# Patient Record
Sex: FEMALE | Race: WHITE | NOT HISPANIC OR LATINO | Employment: UNEMPLOYED | URBAN - METROPOLITAN AREA
[De-identification: names, ages, dates, MRNs, and addresses within clinical notes are randomized per-mention and may not be internally consistent; named-entity substitution may affect disease eponyms.]

---

## 2018-02-19 ENCOUNTER — APPOINTMENT (EMERGENCY)
Dept: CT IMAGING | Facility: HOSPITAL | Age: 58
End: 2018-02-19
Payer: COMMERCIAL

## 2018-02-19 ENCOUNTER — HOSPITAL ENCOUNTER (EMERGENCY)
Facility: HOSPITAL | Age: 58
Discharge: ANOTHER HOSPITAL | End: 2018-02-19
Attending: EMERGENCY MEDICINE
Payer: COMMERCIAL

## 2018-02-19 ENCOUNTER — APPOINTMENT (OUTPATIENT)
Dept: INTERVENTIONAL RADIOLOGY/VASCULAR | Facility: HOSPITAL | Age: 58
End: 2018-02-19
Attending: EMERGENCY MEDICINE
Payer: COMMERCIAL

## 2018-02-19 VITALS
HEIGHT: 68 IN | TEMPERATURE: 101.4 F | BODY MASS INDEX: 25.01 KG/M2 | HEART RATE: 130 BPM | WEIGHT: 165 LBS | RESPIRATION RATE: 34 BRPM | DIASTOLIC BLOOD PRESSURE: 61 MMHG | OXYGEN SATURATION: 94 % | SYSTOLIC BLOOD PRESSURE: 101 MMHG

## 2018-02-19 DIAGNOSIS — A41.9 SEPSIS (HCC): Primary | ICD-10-CM

## 2018-02-19 DIAGNOSIS — T81.43XA POSTPROCEDURAL INTRAABDOMINAL ABSCESS: ICD-10-CM

## 2018-02-19 DIAGNOSIS — J90 PLEURAL EFFUSION: ICD-10-CM

## 2018-02-19 LAB
ALBUMIN SERPL BCP-MCNC: 1.3 G/DL (ref 3.5–5)
ALP SERPL-CCNC: 342 U/L (ref 46–116)
ALT SERPL W P-5'-P-CCNC: 126 U/L (ref 12–78)
ANION GAP BLD CALC-SCNC: 20 MMOL/L (ref 4–13)
ANION GAP SERPL CALCULATED.3IONS-SCNC: 11 MMOL/L (ref 4–13)
APPEARANCE FLD: ABNORMAL
APTT PPP: 33 SECONDS (ref 23–35)
AST SERPL W P-5'-P-CCNC: 208 U/L (ref 5–45)
BACTERIA UR QL AUTO: ABNORMAL /HPF
BASOPHILS # BLD MANUAL: 0 THOUSAND/UL (ref 0–0.1)
BASOPHILS NFR MAR MANUAL: 0 % (ref 0–1)
BILIRUB SERPL-MCNC: 0.9 MG/DL (ref 0.2–1)
BILIRUB UR QL STRIP: NEGATIVE
BUN BLD-MCNC: 9 MG/DL (ref 5–25)
BUN SERPL-MCNC: 10 MG/DL (ref 5–25)
CA-I BLD-SCNC: 1.01 MMOL/L (ref 1.12–1.32)
CALCIUM SERPL-MCNC: 7.8 MG/DL (ref 8.3–10.1)
CHLORIDE BLD-SCNC: 96 MMOL/L (ref 100–108)
CHLORIDE SERPL-SCNC: 98 MMOL/L (ref 100–108)
CLARITY UR: CLEAR
CO2 SERPL-SCNC: 26 MMOL/L (ref 21–32)
COLOR FLD: YELLOW
COLOR UR: YELLOW
CREAT BLD-MCNC: 0.6 MG/DL (ref 0.6–1.3)
CREAT SERPL-MCNC: 0.78 MG/DL (ref 0.6–1.3)
EOSINOPHIL # BLD MANUAL: 0 THOUSAND/UL (ref 0–0.4)
EOSINOPHIL NFR BLD MANUAL: 0 % (ref 0–6)
ERYTHROCYTE [DISTWIDTH] IN BLOOD BY AUTOMATED COUNT: 14.7 % (ref 11.6–15.1)
GFR SERPL CREATININE-BSD FRML MDRD: 102 ML/MIN/1.73SQ M
GFR SERPL CREATININE-BSD FRML MDRD: 85 ML/MIN/1.73SQ M
GLUCOSE SERPL-MCNC: 102 MG/DL (ref 65–140)
GLUCOSE SERPL-MCNC: 106 MG/DL (ref 65–140)
GLUCOSE UR STRIP-MCNC: NEGATIVE MG/DL
HCT VFR BLD AUTO: 27.5 % (ref 34.8–46.1)
HCT VFR BLD CALC: 29 % (ref 34.8–46.1)
HGB BLD-MCNC: 9.2 G/DL (ref 11.5–15.4)
HGB BLDA-MCNC: 9.9 G/DL (ref 11.5–15.4)
HGB UR QL STRIP.AUTO: ABNORMAL
INR PPP: 1.11 (ref 0.86–1.16)
KETONES UR STRIP-MCNC: ABNORMAL MG/DL
LACTATE SERPL-SCNC: 1.8 MMOL/L (ref 0.5–2)
LEUKOCYTE ESTERASE UR QL STRIP: NEGATIVE
LYMPHOCYTES # BLD AUTO: 0.57 THOUSAND/UL (ref 0.6–4.47)
LYMPHOCYTES # BLD AUTO: 2 % (ref 14–44)
LYMPHOCYTES NFR BLD AUTO: 5 %
MCH RBC QN AUTO: 29.3 PG (ref 26.8–34.3)
MCHC RBC AUTO-ENTMCNC: 33.5 G/DL (ref 31.4–37.4)
MCV RBC AUTO: 88 FL (ref 82–98)
METAMYELOCYTES NFR BLD MANUAL: 2 % (ref 0–1)
MONO+MESO NFR FLD MANUAL: 9 %
MONOCYTES # BLD AUTO: 0.57 THOUSAND/UL (ref 0–1.22)
MONOCYTES NFR BLD AUTO: 8 %
MONOCYTES NFR BLD: 2 % (ref 4–12)
NEUTROPHILS # BLD MANUAL: 26.7 THOUSAND/UL (ref 1.85–7.62)
NEUTS BAND NFR BLD MANUAL: 1 % (ref 0–8)
NEUTS SEG NFR BLD AUTO: 78 %
NEUTS SEG NFR BLD AUTO: 92 % (ref 43–75)
NITRITE UR QL STRIP: NEGATIVE
NON-SQ EPI CELLS URNS QL MICRO: ABNORMAL /HPF
NRBC BLD AUTO-RTO: 0 /100 WBCS
PCO2 BLD: 23 MMOL/L (ref 21–32)
PH UR STRIP.AUTO: 6 [PH] (ref 4.5–8)
PLATELET # BLD AUTO: 651 THOUSANDS/UL (ref 149–390)
PLATELET BLD QL SMEAR: ABNORMAL
PMV BLD AUTO: 9.3 FL (ref 8.9–12.7)
POTASSIUM BLD-SCNC: 3.6 MMOL/L (ref 3.5–5.3)
POTASSIUM SERPL-SCNC: 3.7 MMOL/L (ref 3.5–5.3)
PROT SERPL-MCNC: 5.7 G/DL (ref 6.4–8.2)
PROT UR STRIP-MCNC: ABNORMAL MG/DL
PROTHROMBIN TIME: 14.6 SECONDS (ref 12.1–14.4)
RBC # BLD AUTO: 3.14 MILLION/UL (ref 3.81–5.12)
RBC #/AREA URNS AUTO: ABNORMAL /HPF
SITE: ABNORMAL
SODIUM BLD-SCNC: 134 MMOL/L (ref 136–145)
SODIUM SERPL-SCNC: 135 MMOL/L (ref 136–145)
SP GR UR STRIP.AUTO: 1.01 (ref 1–1.03)
SPECIMEN SOURCE: ABNORMAL
TOTAL CELLS COUNTED SPEC: 100
TOTAL CELLS COUNTED SPEC: 100
TROPONIN I SERPL-MCNC: 0.22 NG/ML
UROBILINOGEN UR QL STRIP.AUTO: 1 E.U./DL
VARIANT LYMPHS # BLD AUTO: 1 %
WBC # BLD AUTO: 28.71 THOUSAND/UL (ref 4.31–10.16)
WBC # FLD MANUAL: 6724 /UL
WBC #/AREA URNS AUTO: ABNORMAL /HPF

## 2018-02-19 PROCEDURE — 81001 URINALYSIS AUTO W/SCOPE: CPT | Performed by: EMERGENCY MEDICINE

## 2018-02-19 PROCEDURE — 89051 BODY FLUID CELL COUNT: CPT | Performed by: EMERGENCY MEDICINE

## 2018-02-19 PROCEDURE — 85014 HEMATOCRIT: CPT

## 2018-02-19 PROCEDURE — 32555 ASPIRATE PLEURA W/ IMAGING: CPT | Performed by: RADIOLOGY

## 2018-02-19 PROCEDURE — 85610 PROTHROMBIN TIME: CPT | Performed by: EMERGENCY MEDICINE

## 2018-02-19 PROCEDURE — 96361 HYDRATE IV INFUSION ADD-ON: CPT

## 2018-02-19 PROCEDURE — 85027 COMPLETE CBC AUTOMATED: CPT | Performed by: EMERGENCY MEDICINE

## 2018-02-19 PROCEDURE — 99285 EMERGENCY DEPT VISIT HI MDM: CPT

## 2018-02-19 PROCEDURE — 36415 COLL VENOUS BLD VENIPUNCTURE: CPT | Performed by: EMERGENCY MEDICINE

## 2018-02-19 PROCEDURE — 32555 ASPIRATE PLEURA W/ IMAGING: CPT

## 2018-02-19 PROCEDURE — 80053 COMPREHEN METABOLIC PANEL: CPT | Performed by: EMERGENCY MEDICINE

## 2018-02-19 PROCEDURE — 74177 CT ABD & PELVIS W/CONTRAST: CPT

## 2018-02-19 PROCEDURE — 96374 THER/PROPH/DIAG INJ IV PUSH: CPT

## 2018-02-19 PROCEDURE — 96376 TX/PRO/DX INJ SAME DRUG ADON: CPT

## 2018-02-19 PROCEDURE — 71275 CT ANGIOGRAPHY CHEST: CPT

## 2018-02-19 PROCEDURE — 87147 CULTURE TYPE IMMUNOLOGIC: CPT | Performed by: EMERGENCY MEDICINE

## 2018-02-19 PROCEDURE — 80047 BASIC METABLC PNL IONIZED CA: CPT

## 2018-02-19 PROCEDURE — 85007 BL SMEAR W/DIFF WBC COUNT: CPT | Performed by: EMERGENCY MEDICINE

## 2018-02-19 PROCEDURE — 96375 TX/PRO/DX INJ NEW DRUG ADDON: CPT

## 2018-02-19 PROCEDURE — 87205 SMEAR GRAM STAIN: CPT | Performed by: EMERGENCY MEDICINE

## 2018-02-19 PROCEDURE — 93005 ELECTROCARDIOGRAM TRACING: CPT

## 2018-02-19 PROCEDURE — 87185 SC STD ENZYME DETCJ PER NZM: CPT | Performed by: EMERGENCY MEDICINE

## 2018-02-19 PROCEDURE — 84484 ASSAY OF TROPONIN QUANT: CPT | Performed by: EMERGENCY MEDICINE

## 2018-02-19 PROCEDURE — 87076 CULTURE ANAEROBE IDENT EACH: CPT | Performed by: EMERGENCY MEDICINE

## 2018-02-19 PROCEDURE — 96365 THER/PROPH/DIAG IV INF INIT: CPT

## 2018-02-19 PROCEDURE — 87070 CULTURE OTHR SPECIMN AEROBIC: CPT | Performed by: EMERGENCY MEDICINE

## 2018-02-19 PROCEDURE — 83605 ASSAY OF LACTIC ACID: CPT | Performed by: EMERGENCY MEDICINE

## 2018-02-19 PROCEDURE — 85730 THROMBOPLASTIN TIME PARTIAL: CPT | Performed by: EMERGENCY MEDICINE

## 2018-02-19 PROCEDURE — 87040 BLOOD CULTURE FOR BACTERIA: CPT | Performed by: EMERGENCY MEDICINE

## 2018-02-19 RX ORDER — LIDOCAINE HYDROCHLORIDE 10 MG/ML
INJECTION, SOLUTION INFILTRATION; PERINEURAL CODE/TRAUMA/SEDATION MEDICATION
Status: COMPLETED | OUTPATIENT
Start: 2018-02-19 | End: 2018-02-19

## 2018-02-19 RX ORDER — ACETAMINOPHEN 650 MG/1
650 SUPPOSITORY RECTAL ONCE
Status: COMPLETED | OUTPATIENT
Start: 2018-02-19 | End: 2018-02-19

## 2018-02-19 RX ORDER — FENTANYL CITRATE 50 UG/ML
50 INJECTION, SOLUTION INTRAMUSCULAR; INTRAVENOUS ONCE
Status: COMPLETED | OUTPATIENT
Start: 2018-02-19 | End: 2018-02-19

## 2018-02-19 RX ORDER — LORAZEPAM 2 MG/ML
0.5 INJECTION INTRAMUSCULAR ONCE
Status: COMPLETED | OUTPATIENT
Start: 2018-02-19 | End: 2018-02-19

## 2018-02-19 RX ADMIN — FENTANYL CITRATE 50 MCG: 50 INJECTION INTRAMUSCULAR; INTRAVENOUS at 13:13

## 2018-02-19 RX ADMIN — FENTANYL CITRATE 50 MCG: 50 INJECTION INTRAMUSCULAR; INTRAVENOUS at 14:47

## 2018-02-19 RX ADMIN — PIPERACILLIN SODIUM,TAZOBACTAM SODIUM 3.38 G: 3; .375 INJECTION, POWDER, FOR SOLUTION INTRAVENOUS at 12:40

## 2018-02-19 RX ADMIN — SODIUM CHLORIDE 1000 ML: 0.9 INJECTION, SOLUTION INTRAVENOUS at 13:12

## 2018-02-19 RX ADMIN — ACETAMINOPHEN 650 MG: 650 SUPPOSITORY RECTAL at 14:40

## 2018-02-19 RX ADMIN — SODIUM CHLORIDE 1000 ML: 0.9 INJECTION, SOLUTION INTRAVENOUS at 14:30

## 2018-02-19 RX ADMIN — LIDOCAINE HYDROCHLORIDE 10 ML: 10 INJECTION, SOLUTION INFILTRATION; PERINEURAL at 14:03

## 2018-02-19 RX ADMIN — IOHEXOL 100 ML: 350 INJECTION, SOLUTION INTRAVENOUS at 12:05

## 2018-02-19 RX ADMIN — SODIUM CHLORIDE 1000 ML: 0.9 INJECTION, SOLUTION INTRAVENOUS at 11:42

## 2018-02-19 RX ADMIN — LORAZEPAM 0.5 MG: 2 INJECTION INTRAMUSCULAR; INTRAVENOUS at 14:47

## 2018-02-19 RX ADMIN — LORAZEPAM 0.5 MG: 2 INJECTION INTRAMUSCULAR; INTRAVENOUS at 13:15

## 2018-02-19 NOTE — PROGRESS NOTES
Vascular and Interventional Radiology Pre Procedure Note    Diagnosis:  Bilateral pleural effusions, postoperative sepsis    Procedure:  Image guided left thoracentesis    HPI:  27-year-old female who underwent laparoscopic repair of hiatal hernia at an outside institution approximately 1 week ago, with the procedure completed February 9, 2018  Patient was doing well and tolerating a diet until this morning when she began to have fever, chills, rigors  She presented to the emergency department for evaluation  CT scan of the chest abdomen and pelvis demonstrated bilateral pleural effusions, left greater than right as well as evidence of intra-abdominal abscess and free air  Patient is having significant shortness of breath requiring non-rebreather mask and oxygen supplementation  Exam:  General:  Awake, alert, oriented x3, mild-to-moderate distress  Neck:  Soft, supple, nontender  Chest:  Nontender, no deformity  Abdomen:  Soft, distended, tender in all 4 quadrants, no guarding or rebound      Labs:  Hematology:  WBC 28 71, hemoglobin 9 2, hematocrit 27 5, platelets 439  Chemistry:  Sodium 135, potassium 3 7, chloride 98, carbon dioxide 26, BUN 10, creatinine 0 78, glucose 102  Hepatic function:  , , alkaline phosphatase 342, total bilirubin 0 9  Coagulation:  INR 1 11, prothrombin time 14 6    Imaging:  CT scan from earlier today reviewed, demonstrating bilateral pleural effusions, left greater than right  In addition, there is likely large gastroesophageal leak with abscess formation in the posterior mediastinum as well as large abscess in the left upper quadrant about the spleen  Multiple sites of intra-abdominal free air as well as subcutaneous free air  Plan:  Proceed with image guided left-sided thoracentesis to improve patient's breathing  I did discuss the plan with the emergency department team including the charge nurse  I recommended immediate surgical consultation    I am told that the patient is being transported down the [place]  The thoracentesis is needed to improve patient's respiratory status prior to transport  I discussed the procedure, its details, risks, benefits and alternatives with the patient  All questions were answered  Patient elects to proceed with the procedure  H&P was reviewed

## 2018-02-19 NOTE — BRIEF OP NOTE (RAD/CATH)
Left image guided thoracentesis  Procedure Note    PATIENT NAME: Vesna Tinajero  : 1960  MRN: [id number]     Pre-op Diagnosis:   Bilateral pleural effusions,  Sepsis  Multiple intra-abdominal abscesses  Suspected gastroesophageal leak    Post-op Diagnosis:   Same    Surgeon:   Oniel Wilde MD  Assistants:     No qualified resident was available, Resident is only observing    Estimated Blood Loss:  Minimal, 1 mL  Findings: Moderate-sized left-sided pleural effusion  Subdiaphragmatic collection, likely abscess    Needle visualized within pleural fluid successful removal of 800 mL cloudy yellow fluid  Post image demonstrates no significant residual fluid with re-expansion of the lung  My understanding that the patient will be transferred to [place] for ultimate treatment  I discussed the patient's clinical situation with Dr Kody Rolle, as well as the imaging documenting subdiaphragmatic abscess and my suspicion that there is a gastroesophageal leak given the findings on the CT scan  Clinically, the patient appears to be in sepsis with concern for conversion to septic shock  Dr Kody Rolle is currently evaluating the patient for appropriateness of transfer  We remain available for any additional help that we can offer  I updated the patient and her  regarding the findings of the procedure and the results      Specimens:  800 cc cloudy yellow fluid    Complications:  Nothing immediately apparent    Anesthesia: Cheyanne Sanchez MD     Date: 2018  Time: 2:23 PM

## 2018-02-19 NOTE — ED PROVIDER NOTES
Pt Name: Daiv Blanca  MRN: [id number]  [place] 1960  Age/Sex: 62 y o  female  Date of evaluation: 2/19/2018  PCP: Romeo Yu MD    [address] [place] [address]    Chief Complaint   Patient presents with    Shortness of Breath     pt had hernia surgery last friday and became sob today         ESTEPHANIA Chacko presents to the Emergency Department complaining of SOB  She is approximately 10 days post op from hiatal hernia repair done in [place]  She was doing well and today suddenly was gasping and SOB  She has been taking liquids po and pain has been manageable for the last week since she was discharged from the hospital   Her  has been caring for her at home without a problem  HPI      Past Medical and Surgical History    Past Medical History:   Diagnosis Date    Disease of thyroid gland     Hyperlipidemia        Past Surgical History:   Procedure Laterality Date    APPENDECTOMY      HERNIA REPAIR      OVARIAN CYST REMOVAL         History reviewed  No pertinent family history  Social History   Substance Use Topics    Smoking status: Never Smoker    Smokeless tobacco: Not on file    Alcohol use No              Allergies    Allergies   Allergen Reactions    Metronidazole Rash       Home Medications    Prior to Admission medications    Not on File           Review of Systems    Review of Systems   Constitutional: Positive for appetite change, chills and fatigue  Negative for activity change, diaphoresis and fever  HENT: Negative for congestion, postnasal drip, rhinorrhea, sinus pressure, sneezing and sore throat  Eyes: Negative for pain and visual disturbance  Respiratory: Positive for shortness of breath  Negative for cough and chest tightness  Cardiovascular: Negative for chest pain, palpitations and leg swelling  Gastrointestinal: Positive for abdominal pain  Negative for abdominal distention, constipation, diarrhea, nausea and vomiting     Endocrine: Negative for polydipsia, polyphagia and polyuria  Genitourinary: Negative for decreased urine volume, difficulty urinating, dysuria, flank pain, frequency and hematuria  Musculoskeletal: Negative for arthralgias, gait problem, joint swelling and neck pain  Skin: Negative for pallor and rash  Allergic/Immunologic: Negative for immunocompromised state  Neurological: Negative for syncope, speech difficulty, weakness, light-headedness, numbness and headaches  All other systems reviewed and are negative  Physical Exam      ED Triage Vitals   Temperature Pulse Respirations Blood Pressure SpO2   02/19/18 1118 02/19/18 1118 02/19/18 1118 02/19/18 1118 02/19/18 1118   100 °F (37 8 °C) (!) 140 22 110/63 93 %      Temp Source Heart Rate Source Patient Position - Orthostatic VS BP Location FiO2 (%)   02/19/18 1230 02/19/18 1230 02/19/18 1230 02/19/18 1230 --   Oral Monitor Lying Right arm       Pain Score       02/19/18 1118       3               Physical Exam   Constitutional: She is oriented to person, place, and time  She appears well-developed and well-nourished  She appears toxic  She has a sickly appearance  She appears ill  She appears distressed  HENT:   Head: Normocephalic and atraumatic  Nose: Nose normal    Mouth/Throat: Oropharynx is clear and moist    Eyes: Conjunctivae, EOM and lids are normal  Pupils are equal, round, and reactive to light  Neck: Normal range of motion  Neck supple  Cardiovascular: Normal rate, regular rhythm and normal heart sounds  Exam reveals no gallop and no friction rub  No murmur heard  Pulmonary/Chest: Accessory muscle usage present  Tachypnea noted  She is in respiratory distress  She has decreased breath sounds  She has no wheezes  She has no rales  Abdominal: Soft  She exhibits no distension  There is generalized tenderness  There is no rebound and no guarding  Surgical wounds with steristrips in place  No surrounding erythema/ drainage      Neurological: She is alert and oriented to person, place, and time  No cranial nerve deficit or sensory deficit  Skin: Skin is warm and dry  No rash noted  She is not diaphoretic  No erythema  Psychiatric: She has a normal mood and affect  Her speech is normal and behavior is normal  Judgment and thought content normal    Nursing note and vitals reviewed  Assessment and Plan    Christian Reed is a 62 y o  female who presents with acute onset of SOB  Physical examination remarkable for respiratory distress  Differential diagnosis (not completely inclusive) includes PE/ post operative complication/ sepsis  Plan will be to perform diagnostic testing and treat symptomatically  MDM    Diagnostic Results    EKG:  sinus tachycardia    EKG INTERPRETATION  RHYTHM:sinus tach at 130  AXIS: normal  INTERVALS: normal  QRS COMPLEX: normal  ST SEGMENT: normal  QT INTERVAL: normal  COMPARED WITH PRIOR none available  Interpretation by Sandeep Dao DO  EKG reviewed and interpreted independently      Labs:    Results for orders placed or performed during the hospital encounter of 02/19/18   CBC and differential   Result Value Ref Range    WBC 28 71 (H) 4 31 - 10 16 Thousand/uL    RBC 3 14 (L) 3 81 - 5 12 Million/uL    Hemoglobin 9 2 (L) 11 5 - 15 4 g/dL    Hematocrit 27 5 (L) 34 8 - 46 1 %    MCV 88 82 - 98 fL    MCH 29 3 26 8 - 34 3 pg    MCHC 33 5 31 4 - 37 4 g/dL    RDW 14 7 11 6 - 15 1 %    MPV 9 3 8 9 - 12 7 fL    Platelets 354 (H) 508 - 390 Thousands/uL    nRBC 0 /100 WBCs   Comprehensive metabolic panel   Result Value Ref Range    Sodium 135 (L) 136 - 145 mmol/L    Potassium 3 7 3 5 - 5 3 mmol/L    Chloride 98 (L) 100 - 108 mmol/L    CO2 26 21 - 32 mmol/L    Anion Gap 11 4 - 13 mmol/L    BUN 10 5 - 25 mg/dL    Creatinine 0 78 0 60 - 1 30 mg/dL    Glucose 102 65 - 140 mg/dL    Calcium 7 8 (L) 8 3 - 10 1 mg/dL     (H) 5 - 45 U/L     (H) 12 - 78 U/L    Alkaline Phosphatase 342 (H) 46 - 116 U/L    Total Protein 5 7 (L) 6 4 - 8 2 g/dL    Albumin 1 3 (L) 3 5 - 5 0 g/dL    Total Bilirubin 0 90 0 20 - 1 00 mg/dL    eGFR 85 ml/min/1 73sq m   Troponin I   Result Value Ref Range    Troponin I 0 22 (H) <=0 04 ng/mL   Protime-INR   Result Value Ref Range    Protime 14 6 (H) 12 1 - 14 4 seconds    INR 1 11 0 86 - 1 16   APTT   Result Value Ref Range    PTT 33 23 - 35 seconds   Lactic acid, plasma   Result Value Ref Range    LACTIC ACID 1 8 0 5 - 2 0 mmol/L   UA w Reflex to Microscopic w Reflex to Culture   Result Value Ref Range    Color, UA Yellow     Clarity, UA Clear     Specific [place], UA 1 010 1 003 - 1 030    pH, UA 6 0 4 5 - 8 0    Leukocytes, UA Negative Negative    Nitrite, UA Negative Negative    Protein, UA 30 (1+) (A) Negative mg/dl    Glucose, UA Negative Negative mg/dl    Ketones, UA 15 (1+) (A) Negative mg/dl    Urobilinogen, UA 1 0 0 2, 1 0 E U /dl E U /dl    Bilirubin, UA Negative Negative    Blood, UA Trace-Intact (A) Negative   Urine Microscopic   Result Value Ref Range    RBC, UA 0-1 (A) None Seen, 0-5 /hpf    WBC, UA 2-4 (A) None Seen, 0-5, 5-55, 5-65 /hpf    Epithelial Cells Occasional None Seen, Occasional /hpf    Bacteria, UA Occasional None Seen, Occasional /hpf   POCT Chem 8+   Result Value Ref Range    SODIUM, I-STAT 134 (L) 136 - 145 mmol/l    Potassium, i-STAT 3 6 3 5 - 5 3 mmol/L    Chloride, istat 96 (L) 100 - 108 mmol/L    CO2, i-STAT 23 21 - 32 mmol/L    Anion Gap, Istat 20 (H) 4 - 13 mmol/L    Calcium, Ionized i-STAT 1 01 (L) 1 12 - 1 32 mmol/L    BUN, I-STAT 9 5 - 25 mg/dl    Creatinine, i-STAT 0 6 0 6 - 1 3 mg/dl    eGFR 102 ml/min/1 73sq m    Glucose, i-STAT 106 65 - 140 mg/dl    Hct, i-STAT 29 (L) 34 8 - 46 1 %    Hgb, i-STAT 9 9 (L) 11 5 - 15 4 g/dl    Specimen Type VENOUS    Manual Differential(PHLEBS Do Not Order)   Result Value Ref Range    Segmented % 92 (H) 43 - 75 %    Bands % 1 0 - 8 %    Lymphocytes % 2 (L) 14 - 44 %    Monocytes % 2 (L) 4 - 12 %    Eosinophils % 0 0 - 6 %    Basophils % 0 0 - 1 %    Metamyelocytes% 2 (H) 0 - 1 %    Atypical Lymphocytes % 1 (H) <=0 %    Absolute Neutrophils 26 70 (H) 1 85 - 7 62 Thousand/uL    Lymphocytes Absolute 0 57 (L) 0 60 - 4 47 Thousand/uL    Monocytes Absolute 0 57 0 00 - 1 22 Thousand/uL    Eosinophils Absolute 0 00 0 00 - 0 40 Thousand/uL    Basophils Absolute 0 00 0 00 - 0 10 Thousand/uL    Total Counted 100     Platelet Estimate Increased (A) Adequate       All labs reviewed and utilized in the medical decision making process    Radiology:    PE Study with CT Abdomen and Pelvis with contrast   Final Result      Multiple left upper abdominal air-fluid collections compatible with abscesses as described above, the largest measuring 21 3 x 20 4 x 4 5 cm  A left subpulmonic fluid collection causes mass effect on the adjacent spleen  A subcapsular splenic    collection cannot be excluded  Although the patient has a history of recent hiatal hernia repair, there is a large presumed hiatal hernia within the distal posterior mediastinum  The stomach is relatively collapsed  No prior studies are available for comparison  The study was    limited by the lack of oral contrast       Mildly limited CTA of the chest demonstrates no gross evidence of an intraluminal filling defect to suggest a pulmonary embolus  Moderate-sized left and small to moderate size right pleural effusions  Posterior left lower lobe consolidation  Scattered foci of free intraperitoneal air within the upper abdomen, likely postoperative in nature  Extensive subcutaneous emphysema within the anterior abdomen and pelvis which tracks along the right anterior abdominal wall, likely postoperative in    nature  However, the amount of subcutaneous edema is more than what is expected in a patient who is one week postop  Underlying infection cannot be excluded                  I personally discussed this study with SUSAN WEBB on 2/19/2018 at 12:20 PM  Workstation performed: [id number]         IR thoracentesis    (Results Pending)       All radiology studies independently viewed by me and interpreted by the radiologist     Procedure    CriticalCare Time  Performed by: Sarah Stringer  Authorized by: Sarah Stringer     Critical care provider statement:     Critical care time (minutes):  90    Critical care time was exclusive of:  Separately billable procedures and treating other patients and teaching time    Critical care was necessary to treat or prevent imminent or life-threatening deterioration of the following conditions:  Respiratory failure, sepsis and shock    Critical care was time spent personally by me on the following activities:  Blood draw for specimens, obtaining history from patient or surrogate, development of treatment plan with patient or surrogate, discussions with consultants, evaluation of patient's response to treatment, examination of patient, interpretation of cardiac output measurements, ordering and performing treatments and interventions, ordering and review of laboratory studies, ordering and review of radiographic studies, re-evaluation of patient's condition and review of old charts    I assumed direction of critical care for this patient from another provider in my specialty: no          CritCare Time      ED Course of Care and Re-Assessments    12:30  I spoke with Dr Richy Stanford who will accept patient transfer to [facility]  12:42 I spoke with PACs for arrangements  1:45 Bed assigned  Awaiting transport arrangements       Medications   sodium chloride 0 9 % bolus 1,000 mL (1,000 mL Intravenous New Bag 2/19/18 1430)   fentanyl citrate (PF) 100 MCG/2ML 50 mcg (not administered)   LORazepam (ATIVAN) 2 mg/mL injection 0 5 mg (not administered)   sodium chloride 0 9 % bolus 1,000 mL (0 mL Intravenous Stopped 2/19/18 1242)   iohexol (OMNIPAQUE) 350 MG/ML injection (MULTI-DOSE) 100 mL (100 mL Intravenous Given 2/19/18 1205)   piperacillin-tazobactam (ZOSYN) 3 375 g in sodium chloride 0 9 % 50 mL IVPB (0 g Intravenous Stopped 2/19/18 1310)   sodium chloride 0 9 % bolus 1,000 mL (0 mL Intravenous Stopped 2/19/18 1412)   fentanyl citrate (PF) 100 MCG/2ML 50 mcg (50 mcg Intravenous Given 2/19/18 1313)   LORazepam (ATIVAN) 2 mg/mL injection 0 5 mg (0 5 mg Intravenous Given 2/19/18 1315)   lidocaine (XYLOCAINE) 1 % injection (10 mL Infiltration Given 2/19/18 1403)   acetaminophen (TYLENOL) rectal suppository 650 mg (650 mg Rectal Given 2/19/18 1440)     Patient had thoracentesis by IR (800CC fluid)  FINAL IMPRESSION    Final diagnoses:   Sepsis ([facility] [address] )   Postprocedural intraabdominal abscess   Pleural effusion         DISPOSITION/PLAN      Time reflects when diagnosis was documented in both MDM as applicable and the Disposition within this note     Time User Action Codes Description Comment    2/19/2018  2:43 PM Anice Bares L Add [A41 9] Sepsis ([facility] [address] )     2/19/2018  2:43 PM Anice Bares Add Bassus Her  4XXA] Postprocedural intraabdominal abscess     2/19/2018  2:43 PM Anice Bares L Add [J90] Pleural effusion       ED Disposition     ED Disposition Condition Comment    Transfer to Another Facility  Sharonda Bishop should be transferred out to [facility]        MD Documentation    Flowsheet Row Most Recent Value   Patient Condition  An emergency transfer is being made prior to stabilization due to the need for definitive care and the benefit of transfer outweighs the risk   Reason for Transfer  Level of Care needed not available at this facility   Benefits of Transfer  Specialized equipment and/or services available at the receiving facility (Include comment)________________________, Continuity of care [surgical subspecialty care]   Risks of Transfer  Potential for delay in receiving treatment, Potential deterioration of medical condition, Loss of IV, Increased discomfort during transfer, Possible worsening of condition or death during transfer   Accepting Physician  Dr Ramiro Smith Name, [facility]   Sending MD  -- [Dr Kaylah Salter   Provider Certification  General risk, such as traffic hazards, adverse weather conditions, rough terrain or turbulence, possible failure of equipment (including vehicle or aircraft), or consequences of actions of persons outside the control of the transport personnel      RN Documentation    [address] Name, [address]   -- [[facility]]   Bed Assignment  -- [address]   Report Given to  [facility]  Ambulance   Level of Care  Advanced life support      Follow-up Information    None           PATIENT REFERRED TO:    No follow-up provider specified  DISCHARGE MEDICATIONS:    Patient's Medications    No medications on file       No discharge procedures on file           Charanjit Knutson, [facility],   02/19/18 6031

## 2018-02-19 NOTE — EMTALA/ACUTE CARE TRANSFER
[address] [address]  Dept: [phone]      [id number] TRANSFER CONSENT    NAME Malina Liu                                         1960                              MRN [id number]    I have been informed of my rights regarding examination, treatment, and transfer   by Dr Eliane Polanco: Specialized equipment and/or services available at the receiving facility (Include comment)________________________, Continuity of care (surgical subspecialty care)    Risks: Potential for delay in receiving treatment, Potential deterioration of medical condition, Loss of IV, Increased discomfort during transfer, Possible worsening of condition or death during transfer      Consent for Transfer:  I acknowledge that my medical condition has been evaluated and explained to me by the emergency department physician or other qualified medical person and/or my attending physician, who has recommended that I be transferred to the service of  Accepting Physician: Dr Robinson Monk at [address] Name, [address] :  Elsie Diana)  The above potential benefits of such transfer, the potential risks associated with such transfer, and the probable risks of not being transferred have been explained to me, and I fully understand them  The doctor has explained that, in my case, the benefits of transfer outweigh the risks  I agree to be transferred  I authorize the performance of emergency medical procedures and treatments upon me in both transit and upon arrival at the receiving facility  Additionally, I authorize the release of any and all medical records to the receiving facility and request they be transported with me, if possible  I understand that the safest mode of transportation during a medical emergency is an ambulance and that the Hospital advocates the use of this mode of transport   Risks of traveling to the receiving facility by car, including absence of medical control, life sustaining equipment, such as oxygen, and medical personnel has been explained to me and I fully understand them  (LISANDRO CORRECT BOX BELOW)  [  ]  I consent to the stated transfer and to be transported by ambulance/helicopter  [  ]  I consent to the stated transfer, but refuse transportation by ambulance and accept full responsibility for my transportation by car  I understand the risks of non-ambulance transfers and I exonerate the Hospital and its staff from any deterioration in my condition that results from this refusal     X___________________________________________    DATE  18  TIME________  Signature of patient or legally responsible individual signing on patient behalf           RELATIONSHIP TO PATIENT_________________________          Provider Certification    NAME Vanda Fan                                        [facility] 1960                              MRN [id number]    A medical screening exam was performed on the above named patient  Based on the examination:    Condition Necessitating Transfer There were no encounter diagnoses  Patient Condition: An emergency transfer is being made prior to stabilization due to the need for definitive care and the benefit of transfer outweighs the risk    Reason for Transfer: Level of Care needed not available at this facility    Transfer Requirements: Facility  ([place] [address])   · Space available and qualified personnel available for treatment as acknowledged by    · Agreed to accept transfer and to provide appropriate medical treatment as acknowledged by       Dr Thony Jenkins  · Appropriate medical records of the examination and treatment of the patient are provided at the time of transfer   [address], [address] _______  · Transfer will be performed by qualified personnel from    and appropriate transfer equipment as required, including the use of necessary and appropriate life support measures      Provider Certification: I have examined the patient and explained the following risks and benefits of being transferred/refusing transfer to the patient/family:  General risk, such as traffic hazards, adverse weather conditions, rough terrain or turbulence, possible failure of equipment (including vehicle or aircraft), or consequences of actions of persons outside the control of the transport personnel      Based on these reasonable risks and benefits to the patient and/or the unborn child(jensen), and based upon the information available at the time of the patients examination, I certify that the medical benefits reasonably to be expected from the provision of appropriate medical treatments at another medical facility outweigh the increasing risks, if any, to the individuals medical condition, and in the case of labor to the unborn child, from effecting the transfer      X____________________________________________ DATE 02/19/18        TIME_______      ORIGINAL - SEND TO MEDICAL RECORDS   COPY - SEND WITH PATIENT DURING TRANSFER

## 2018-02-20 LAB
ATRIAL RATE: 130 BPM
P AXIS: 44 DEGREES
PR INTERVAL: 124 MS
QRS AXIS: 34 DEGREES
QRSD INTERVAL: 76 MS
QT INTERVAL: 290 MS
QTC INTERVAL: 426 MS
T WAVE AXIS: 41 DEGREES
VENTRICULAR RATE: 130 BPM

## 2018-02-20 PROCEDURE — 93010 ELECTROCARDIOGRAM REPORT: CPT | Performed by: INTERNAL MEDICINE

## 2018-02-21 NOTE — PROGRESS NOTES
Provider Dr Dalia King discussed w patients RN Jaspreet Rubio and the transfer center RN at [facility] - they are both notified of + [facility] blood cultures  This was documented by [place] team and will be shared w the team  The patient remains on BS abx as per RN

## 2018-02-22 LAB
BACTERIA SPEC BFLD CULT: NO GROWTH
GRAM STN SPEC: NORMAL
GRAM STN SPEC: NORMAL

## 2018-02-23 LAB
BACTERIA BLD CULT: ABNORMAL
GRAM STN SPEC: ABNORMAL

## 2018-02-28 LAB
BACTERIA BLD CULT: ABNORMAL
BACTERIA BLD CULT: ABNORMAL
GRAM STN SPEC: ABNORMAL

## 2018-03-05 ENCOUNTER — HOSPITAL ENCOUNTER (EMERGENCY)
Facility: HOSPITAL | Age: 58
Discharge: ANOTHER HOSPITAL | End: 2018-03-06
Attending: EMERGENCY MEDICINE
Payer: COMMERCIAL

## 2018-03-05 DIAGNOSIS — T81.43XA POSTPROCEDURAL INTRAABDOMINAL ABSCESS: Primary | ICD-10-CM

## 2018-03-05 LAB
ALBUMIN SERPL BCP-MCNC: 1.7 G/DL (ref 3.5–5)
ALP SERPL-CCNC: 112 U/L (ref 46–116)
ALT SERPL W P-5'-P-CCNC: 29 U/L (ref 12–78)
ANION GAP SERPL CALCULATED.3IONS-SCNC: 9 MMOL/L (ref 4–13)
AST SERPL W P-5'-P-CCNC: 18 U/L (ref 5–45)
BASOPHILS # BLD AUTO: 0.03 THOUSANDS/ΜL (ref 0–0.1)
BASOPHILS NFR BLD AUTO: 0 % (ref 0–1)
BILIRUB SERPL-MCNC: 0.4 MG/DL (ref 0.2–1)
BUN SERPL-MCNC: 6 MG/DL (ref 5–25)
CALCIUM SERPL-MCNC: 8.1 MG/DL (ref 8.3–10.1)
CHLORIDE SERPL-SCNC: 101 MMOL/L (ref 100–108)
CO2 SERPL-SCNC: 27 MMOL/L (ref 21–32)
CREAT SERPL-MCNC: 0.62 MG/DL (ref 0.6–1.3)
EOSINOPHIL # BLD AUTO: 0.02 THOUSAND/ΜL (ref 0–0.61)
EOSINOPHIL NFR BLD AUTO: 0 % (ref 0–6)
ERYTHROCYTE [DISTWIDTH] IN BLOOD BY AUTOMATED COUNT: 14.2 % (ref 11.6–15.1)
GFR SERPL CREATININE-BSD FRML MDRD: 100 ML/MIN/1.73SQ M
GLUCOSE SERPL-MCNC: 107 MG/DL (ref 65–140)
HCT VFR BLD AUTO: 24.2 % (ref 34.8–46.1)
HGB BLD-MCNC: 7.9 G/DL (ref 11.5–15.4)
LACTATE SERPL-SCNC: 0.7 MMOL/L (ref 0.5–2)
LIPASE SERPL-CCNC: 192 U/L (ref 73–393)
LYMPHOCYTES # BLD AUTO: 1.2 THOUSANDS/ΜL (ref 0.6–4.47)
LYMPHOCYTES NFR BLD AUTO: 8 % (ref 14–44)
MAGNESIUM SERPL-MCNC: 1.6 MG/DL (ref 1.6–2.6)
MCH RBC QN AUTO: 28.9 PG (ref 26.8–34.3)
MCHC RBC AUTO-ENTMCNC: 32.6 G/DL (ref 31.4–37.4)
MCV RBC AUTO: 89 FL (ref 82–98)
MONOCYTES # BLD AUTO: 0.85 THOUSAND/ΜL (ref 0.17–1.22)
MONOCYTES NFR BLD AUTO: 6 % (ref 4–12)
NEUTROPHILS # BLD AUTO: 13.06 THOUSANDS/ΜL (ref 1.85–7.62)
NEUTS SEG NFR BLD AUTO: 86 % (ref 43–75)
NRBC BLD AUTO-RTO: 0 /100 WBCS
PLATELET # BLD AUTO: 651 THOUSANDS/UL (ref 149–390)
PMV BLD AUTO: 9.4 FL (ref 8.9–12.7)
POTASSIUM SERPL-SCNC: 2.8 MMOL/L (ref 3.5–5.3)
PROT SERPL-MCNC: 6.4 G/DL (ref 6.4–8.2)
RBC # BLD AUTO: 2.73 MILLION/UL (ref 3.81–5.12)
SODIUM SERPL-SCNC: 137 MMOL/L (ref 136–145)
WBC # BLD AUTO: 15.26 THOUSAND/UL (ref 4.31–10.16)

## 2018-03-05 PROCEDURE — 87040 BLOOD CULTURE FOR BACTERIA: CPT | Performed by: EMERGENCY MEDICINE

## 2018-03-05 PROCEDURE — 36415 COLL VENOUS BLD VENIPUNCTURE: CPT | Performed by: EMERGENCY MEDICINE

## 2018-03-05 PROCEDURE — 96375 TX/PRO/DX INJ NEW DRUG ADDON: CPT

## 2018-03-05 PROCEDURE — 83605 ASSAY OF LACTIC ACID: CPT | Performed by: EMERGENCY MEDICINE

## 2018-03-05 PROCEDURE — 83735 ASSAY OF MAGNESIUM: CPT | Performed by: EMERGENCY MEDICINE

## 2018-03-05 PROCEDURE — 83690 ASSAY OF LIPASE: CPT | Performed by: EMERGENCY MEDICINE

## 2018-03-05 PROCEDURE — 96361 HYDRATE IV INFUSION ADD-ON: CPT

## 2018-03-05 PROCEDURE — 85025 COMPLETE CBC W/AUTO DIFF WBC: CPT | Performed by: EMERGENCY MEDICINE

## 2018-03-05 PROCEDURE — 80053 COMPREHEN METABOLIC PANEL: CPT | Performed by: EMERGENCY MEDICINE

## 2018-03-05 PROCEDURE — 87798 DETECT AGENT NOS DNA AMP: CPT | Performed by: EMERGENCY MEDICINE

## 2018-03-05 RX ORDER — ONDANSETRON 2 MG/ML
4 INJECTION INTRAMUSCULAR; INTRAVENOUS ONCE
Status: COMPLETED | OUTPATIENT
Start: 2018-03-05 | End: 2018-03-05

## 2018-03-05 RX ADMIN — SODIUM CHLORIDE 1000 ML: 0.9 INJECTION, SOLUTION INTRAVENOUS at 22:59

## 2018-03-05 RX ADMIN — ONDANSETRON 4 MG: 2 INJECTION INTRAMUSCULAR; INTRAVENOUS at 23:12

## 2018-03-06 ENCOUNTER — APPOINTMENT (EMERGENCY)
Dept: CT IMAGING | Facility: HOSPITAL | Age: 58
End: 2018-03-06
Payer: COMMERCIAL

## 2018-03-06 ENCOUNTER — HOSPITAL ENCOUNTER (OUTPATIENT)
Dept: RADIOLOGY | Facility: HOSPITAL | Age: 58
Discharge: HOME | End: 2018-03-06
Payer: COMMERCIAL

## 2018-03-06 VITALS
HEART RATE: 86 BPM | WEIGHT: 160 LBS | TEMPERATURE: 99.7 F | DIASTOLIC BLOOD PRESSURE: 78 MMHG | OXYGEN SATURATION: 94 % | RESPIRATION RATE: 18 BRPM | HEIGHT: 68 IN | BODY MASS INDEX: 24.25 KG/M2 | SYSTOLIC BLOOD PRESSURE: 132 MMHG

## 2018-03-06 LAB
BACTERIA UR QL AUTO: ABNORMAL /HPF
BILIRUB UR QL STRIP: NEGATIVE
CLARITY UR: ABNORMAL
COLOR UR: YELLOW
FLUAV AG SPEC QL: NORMAL
FLUBV AG SPEC QL: NORMAL
GLUCOSE UR STRIP-MCNC: NEGATIVE MG/DL
HGB UR QL STRIP.AUTO: ABNORMAL
KETONES UR STRIP-MCNC: NEGATIVE MG/DL
LEUKOCYTE ESTERASE UR QL STRIP: ABNORMAL
MUCOUS THREADS UR QL AUTO: ABNORMAL
NITRITE UR QL STRIP: NEGATIVE
NON-SQ EPI CELLS URNS QL MICRO: ABNORMAL /HPF
PH UR STRIP.AUTO: 7 [PH] (ref 4.5–8)
PROT UR STRIP-MCNC: NEGATIVE MG/DL
RBC #/AREA URNS AUTO: ABNORMAL /HPF
RSV B RNA SPEC QL NAA+PROBE: NORMAL
SP GR UR STRIP.AUTO: <=1.005 (ref 1–1.03)
UROBILINOGEN UR QL STRIP.AUTO: 0.2 E.U./DL
WBC #/AREA URNS AUTO: ABNORMAL /HPF

## 2018-03-06 PROCEDURE — 74177 CT ABD & PELVIS W/CONTRAST: CPT

## 2018-03-06 PROCEDURE — 99285 EMERGENCY DEPT VISIT HI MDM: CPT

## 2018-03-06 PROCEDURE — 81001 URINALYSIS AUTO W/SCOPE: CPT | Performed by: EMERGENCY MEDICINE

## 2018-03-06 PROCEDURE — 71046 X-RAY EXAM CHEST 2 VIEWS: CPT

## 2018-03-06 PROCEDURE — 96365 THER/PROPH/DIAG IV INF INIT: CPT

## 2018-03-06 PROCEDURE — 96361 HYDRATE IV INFUSION ADD-ON: CPT

## 2018-03-06 RX ORDER — SODIUM CHLORIDE 9 MG/ML
125 INJECTION, SOLUTION INTRAVENOUS CONTINUOUS
Status: DISCONTINUED | OUTPATIENT
Start: 2018-03-06 | End: 2018-03-06 | Stop reason: HOSPADM

## 2018-03-06 RX ADMIN — IOHEXOL 25 ML: 240 INJECTION, SOLUTION INTRATHECAL; INTRAVASCULAR; INTRAVENOUS; ORAL at 00:40

## 2018-03-06 RX ADMIN — SODIUM CHLORIDE 125 ML/HR: 0.9 INJECTION, SOLUTION INTRAVENOUS at 06:46

## 2018-03-06 RX ADMIN — Medication 2000 MG: at 02:39

## 2018-03-06 RX ADMIN — IOHEXOL 100 ML: 350 INJECTION, SOLUTION INTRAVENOUS at 00:40

## 2018-03-06 NOTE — EMTALA/ACUTE CARE TRANSFER
[address] [address]  Dept: [phone]      [facility] TRANSFER CONSENT    NAME Bravo Pereira                                         1960                              MRN [id number]    I have been informed of my rights regarding examination, treatment, and transfer   by Dr Harjinder Monge MD    Benefits: Continuity of care    Risks: Potential for delay in receiving treatment, Potential deterioration of medical condition, Loss of IV, Increased discomfort during transfer, Possible worsening of condition or death during transfer      Consent for Transfer:  I acknowledge that my medical condition has been evaluated and explained to me by the emergency department physician or other qualified medical person and/or my attending physician, who has recommended that I be transferred to the service of  Accepting Physician: Jose F Grewal at [address] Name, [address] : Alicia Factor  The above potential benefits of such transfer, the potential risks associated with such transfer, and the probable risks of not being transferred have been explained to me, and I fully understand them  The doctor has explained that, in my case, the benefits of transfer outweigh the risks  I agree to be transferred  I authorize the performance of emergency medical procedures and treatments upon me in both transit and upon arrival at the receiving facility  Additionally, I authorize the release of any and all medical records to the receiving facility and request they be transported with me, if possible  I understand that the safest mode of transportation during a medical emergency is an ambulance and that the Hospital advocates the use of this mode of transport   Risks of traveling to the receiving facility by car, including absence of medical control, life sustaining equipment, such as oxygen, and medical personnel has been explained to me and I fully understand them     ([facility])  [  ]  I consent to the stated transfer and to be transported by ambulance/helicopter  [  ]  I consent to the stated transfer, but refuse transportation by ambulance and accept full responsibility for my transportation by car  I understand the risks of non-ambulance transfers and I exonerate the Hospital and its staff from any deterioration in my condition that results from this refusal     X___________________________________________    DATE  18  TIME________  Signature of patient or legally responsible individual signing on patient behalf           RELATIONSHIP TO PATIENT_________________________          Provider Certification    NAME Dario Preciado                                         1960                              MRN [id number]    A medical screening exam was performed on the above named patient  Based on the examination:    Condition Necessitating Transfer The encounter diagnosis was Postprocedural intraabdominal abscess  Patient Condition: The patient has been stabilized such that within reasonable medical probability, no material deterioration of the patient condition or the condition of the unborn child(jensen) is likely to result from the transfer    Reason for Transfer: Level of Care needed not available at this facility    Transfer Requirements: [address] [place] [address]   · Space available and qualified personnel available for treatment as acknowledged by [facility]  · Agreed to accept transfer and to provide appropriate medical treatment as acknowledged by       Julieth Busch  · Appropriate medical records of the examination and treatment of the patient are provided at the time of transfer   [address], [address] _______  · Transfer will be performed by qualified personnel from    and appropriate transfer equipment as required, including the use of necessary and appropriate life support measures      Provider Certification: I have examined the patient and explained the following risks and benefits of being transferred/refusing transfer to the patient/family:  General risk, such as traffic hazards, adverse weather conditions, rough terrain or turbulence, possible failure of equipment (including vehicle or aircraft), or consequences of actions of persons outside the control of the transport personnel, Unanticipated needs of medical equipment and personnel during transport, Risk of worsening condition, The possibility of a transport vehicle being unavailable      Based on these reasonable risks and benefits to the patient and/or the unborn child(jensen), and based upon the information available at the time of the patients examination, I certify that the medical benefits reasonably to be expected from the provision of appropriate medical treatments at another medical facility outweigh the increasing risks, if any, to the individuals medical condition, and in the case of labor to the unborn child, from effecting the transfer      X____________________________________________ DATE 03/06/18        TIME_______      ORIGINAL - SEND TO MEDICAL RECORDS   COPY - SEND WITH PATIENT DURING TRANSFER

## 2018-03-06 NOTE — ED NOTES
[facility] will transport patient at 7:15 AM to [address]   Number to call for report [phone]     Pedro Cody  03/06/18 9196

## 2018-03-06 NOTE — ED NOTES
Attempted to call report to [facility] at this time   Nurse will call back at [phone] when ready     Romeo Evans, VIKASH  03/06/18 9184

## 2018-03-06 NOTE — ED NOTES
, Blanca Buerger, made aware of transfer time of 0715 at this time        Claudetta Bible, RN  03/06/18 0743

## 2018-03-06 NOTE — ED NOTES
Call received stating that transportation would arrive either at 7 am or 8 am depending on whether patient is ALS or BLS   Call transferred to Dr Viridiana Saba  03/06/18 Jaylyn Martino  03/06/18 7645

## 2018-03-11 LAB — BACTERIA BLD CULT: NORMAL

## 2018-03-11 NOTE — ED PROVIDER NOTES
History  Chief Complaint   Patient presents with    Fever - 9 weeks to 76 years     Patient recently had laproscopic hiatal hernia surgery and then had an emergent open abdominal surgery in [place]  Today patient presents with a fever of 101 0 and nausea  History provided by:  Patient and relative  Fever - 9 weeks to 74 years   Max temp prior to arrival:  101  Temp source:  Oral  Severity:  Moderate  Onset quality:  Gradual  Duration:  1 day  Timing:  Constant  Progression:  Worsening  Chronicity:  Recurrent (Pt had recent Hiatal hernia repair at [place] in February, had post-op complications with abd abscesses, went back to [facility] and had gravity drains in place, went home 1 week ago, now has worse draiange from right sided tube, started keflex but now has fever)  Worsened by:  Nothing  Ineffective treatments:  None tried  Associated symptoms: chills and nausea    Associated symptoms: no chest pain, no cough, no diarrhea, no dysuria and no vomiting    Associated symptoms comment:  Abd pain   Risk factors: recent sickness        None       Past Medical History:   Diagnosis Date    Disease of thyroid gland     Hyperlipidemia        Past Surgical History:   Procedure Laterality Date    APPENDECTOMY      HERNIA REPAIR      OVARIAN CYST REMOVAL         History reviewed  No pertinent family history  I have reviewed and agree with the history as documented  Social History   Substance Use Topics    Smoking status: Never Smoker    Smokeless tobacco: Never Used    Alcohol use No        Review of Systems   Constitutional: Positive for chills and fever  Respiratory: Negative for cough  Cardiovascular: Negative for chest pain  Gastrointestinal: Positive for nausea  Negative for diarrhea and vomiting  Genitourinary: Negative for dysuria  All other systems reviewed and are negative        Physical Exam  ED Triage Vitals [03/05/18 2105]   Temperature Pulse Respirations Blood Pressure SpO2   99 1 °F (37 3 °C) 97 22 134/80 99 %      Temp Source Heart Rate Source Patient Position - Orthostatic VS BP Location FiO2 (%)   Oral Monitor Sitting Left arm --      Pain Score       3           Orthostatic Vital Signs  Vitals:    03/05/18 2105 03/05/18 2331 03/06/18 0630   BP: 134/80 146/80 132/78   Pulse: 97 90 86   Patient Position - Orthostatic VS: Sitting Sitting Sitting       Physical Exam   Constitutional: She is oriented to person, place, and time  She appears well-developed  She appears ill  No distress  HENT:   Head: Normocephalic  Mouth/Throat: Mucous membranes are pale and dry  Eyes: EOM are normal  Pupils are equal, round, and reactive to light  Neck: Neck supple  Cardiovascular: Normal rate and regular rhythm  No murmur heard  Pulmonary/Chest: Effort normal and breath sounds normal  No respiratory distress  Abdominal: Soft  Bowel sounds are normal  She exhibits distension  There is tenderness  Some diffuse upper abd tenderness, has gravity drains in place and RUQ drain with purulent drainage in tube  Musculoskeletal: She exhibits no edema  Neurological: She is alert and oriented to person, place, and time  Skin: Skin is dry  Capillary refill takes less than 2 seconds  No rash noted  There is pallor  Nursing note and vitals reviewed        ED Medications  Medications   sodium chloride 0 9 % bolus 1,000 mL (0 mL Intravenous Stopped 3/5/18 2359)   ondansetron (ZOFRAN) injection 4 mg (4 mg Intravenous Given 3/5/18 2312)   iohexol (OMNIPAQUE) 240 MG/ML solution 50 mL (25 mL Oral Given 3/6/18 0040)   iohexol (OMNIPAQUE) 350 MG/ML injection (MULTI-DOSE) 100 mL (100 mL Intravenous Given 3/6/18 0040)   cefepime (MAXIPIME) 2,000 mg in dextrose 5 % 50 mL IVPB (0 mg Intravenous Stopped 3/6/18 0309)       Diagnostic Studies  Results Reviewed     Procedure Component Value Units Date/Time    Blood culture #2 [[id number]] Collected:  03/05/18 2254    Lab Status:  Final result Specimen:  Blood from Arm, Right Updated:  03/11/18 1401     Blood Culture No Growth After 5 Days  Influenza A/B and RSV by PCR (indicated for patients >2 mo of age) [[id number]]  (Normal) Collected:  03/05/18 2330    Lab Status:  Final result Specimen:  Nasopharyngeal from Nasopharyngeal Swab Updated:  03/06/18 1127     INFLU A PCR None Detected     INFLU B PCR None Detected     RSV PCR None Detected    Urine Microscopic [[id number]]  (Abnormal) Collected:  03/06/18 0001    Lab Status:  Final result Specimen:  Urine from Urine, Clean Catch Updated:  03/06/18 0014     RBC, UA 0-1 (A) /hpf      WBC, UA 4-10 (A) /hpf      Epithelial Cells Moderate (A) /hpf      Bacteria, UA Occasional /hpf      MUCOUS THREADS Occasional    UA w Reflex to Microscopic w Reflex to Culture [[id number]]  (Abnormal) Collected:  03/06/18 0001    Lab Status:  Final result Specimen:  Urine from Urine, Clean Catch Updated:  03/06/18 0008     Color, UA Yellow     Clarity, UA Slightly Cloudy     Specific Gravity, UA <=1 005     pH, UA 7 0     Leukocytes, UA Trace (A)     Nitrite, UA Negative     Protein, UA Negative mg/dl      Glucose, UA Negative mg/dl      Ketones, UA Negative mg/dl      Urobilinogen, UA 0 2 E U /dl      Bilirubin, UA Negative     Blood, UA Trace-lysed (A)    Lactic acid, plasma [[id number]]  (Normal) Collected:  03/05/18 2254    Lab Status:  Final result Specimen:  Blood from Arm, Right Updated:  03/05/18 2325     LACTIC ACID 0 7 mmol/L     Narrative:         Result may be elevated if tourniquet was used during collection      Comprehensive metabolic panel [[id number]]  (Abnormal) Collected:  03/05/18 2254    Lab Status:  Final result Specimen:  Blood from Arm, Right Updated:  03/05/18 2322     Sodium 137 mmol/L      Potassium 2 8 (L) mmol/L      Chloride 101 mmol/L      CO2 27 mmol/L      Anion Gap 9 mmol/L      BUN 6 mg/dL      Creatinine 0 62 mg/dL      Glucose 107 mg/dL      Calcium 8 1 (L) mg/dL      AST 18 U/L      ALT 29 U/L      Alkaline Phosphatase 112 U/L      Total Protein 6 4 g/dL      Albumin 1 7 (L) g/dL      Total Bilirubin 0 40 mg/dL      eGFR 100 ml/min/1 73sq m     Narrative:         National Kidney Disease Education Program recommendations are as follows:  GFR calculation is accurate only with a steady state creatinine  Chronic Kidney disease less than 60 ml/min/1 73 sq  meters  Kidney failure less than 15 ml/min/1 73 sq  meters  Magnesium [[id number]]  (Normal) Collected:  03/05/18 2254    Lab Status:  Final result Specimen:  Blood from Arm, Right Updated:  03/05/18 2322     Magnesium 1 6 mg/dL     Lipase [[id number]]  (Normal) Collected:  03/05/18 2254    Lab Status:  Final result Specimen:  Blood from Arm, Right Updated:  03/05/18 2322     Lipase 192 u/L     CBC and differential [[id number]]  (Abnormal) Collected:  03/05/18 2254    Lab Status:  Final result Specimen:  Blood from Arm, Right Updated:  03/05/18 2306     WBC 15 26 (H) Thousand/uL      RBC 2 73 (L) Million/uL      Hemoglobin 7 9 (L) g/dL      Hematocrit 24 2 (L) %      MCV 89 fL      MCH 28 9 pg      MCHC 32 6 g/dL      RDW 14 2 %      MPV 9 4 fL      Platelets 526 (H) Thousands/uL      nRBC 0 /100 WBCs      Neutrophils Relative 86 (H) %      Lymphocytes Relative 8 (L) %      Monocytes Relative 6 %      Eosinophils Relative 0 %      Basophils Relative 0 %      Neutrophils Absolute 13 06 (H) Thousands/µL      Lymphocytes Absolute 1 20 Thousands/µL      Monocytes Absolute 0 85 Thousand/µL      Eosinophils Absolute 0 02 Thousand/µL      Basophils Absolute 0 03 Thousands/µL                  XR chest 2 views   ED Interpretation by Sushila Bond MD (03/06 0103)   Left pleural effusion      Final Result by Amina Montiel MD (03/06 0801)      1  Moderate-sized left pleural effusion and small right pleural effusion as apparent on recent CT              Workstation performed: [id number]         CT abdomen pelvis with contrast   Final Result by Mariah Squires MD (03/06 0602)      Slightly decreased size of a paraesophageal fluid collection in the lower mediastinum status post percutaneous drainage catheter placement  Persistent bilateral pleural effusions with bibasilar compressive atelectasis  Decreased size of a fluid collection in the left upper quadrant  Small intramuscular fluid collection in the anterolateral left mid abdominal wall, possibly communicating with the left upper quadrant collection  Finding (s) were preliminarily reported by [facility] service at the time of examination  Workstation performed: [id number]                    Procedures  Procedures       Phone Contacts  ED Phone Contact    ED Course  ED Course as of Mar 11 1522   Tue Mar 06, 2018   0221 Called and spoke with patient's GI surgeon Dr Francesco Conroy at [facility], read with him the labs and the CT scan results  Would start patient on cefepime since she had just started taking Keflex  Patient discussed with her the possibility of transferring back to [facility] because she could need more percutaneous drainage for the CT scan that showed multiple abdominal fluid collections, some with gas and fluid levels and some enhancing consistent with abscess is, although some seemed to be smaller in size  However patient now has fevers where she did not the past   So patient is agreeable for transfer  Discussed with Dr Francesco Conroy and he will accept her in transfer  Called and spoke with [facility] regarding this                                  MDM  Number of Diagnoses or Management Options  Postprocedural intraabdominal abscess: new and requires workup     Amount and/or Complexity of Data Reviewed  Clinical lab tests: ordered and reviewed  Tests in the radiology section of CPT®: ordered and reviewed  Discuss the patient with other providers: yes      CritCare Time    Disposition  Final diagnoses:   Postprocedural intraabdominal abscess     Time reflects when diagnosis was documented in both MDM as applicable and the Disposition within this note     Time User Action Codes Description Comment    3/6/2018  1:58 AM Emmie, [facility]  4XXA] Postprocedural intraabdominal abscess       ED Disposition     ED Disposition Condition Comment    Transfer to Another Facility  Mckenna Mina should be transferred out to [facility] Documentation    Anisha Chase Most Recent Value   Patient Condition  The patient has been stabilized such that within reasonable medical probability, no material deterioration of the patient condition or the condition of the unborn child(jensen) is likely to result from the transfer   Reason for Transfer  Level of Care needed not available at this facility   Benefits of Transfer  Continuity of care   Risks of Transfer  Potential for delay in receiving treatment, Potential deterioration of medical condition, Loss of IV, Increased discomfort during transfer, Possible worsening of condition or death during transfer   Accepting Physician  [address] Name, [address]    (Name & Tel number)  [facility]   Sending MD Anali Adams   Provider Certification  General risk, such as traffic hazards, adverse weather conditions, rough terrain or turbulence, possible failure of equipment (including vehicle or aircraft), or consequences of actions of persons outside the control of the transport personnel, Unanticipated needs of medical equipment and personnel during transport, Risk of worsening condition, The possibility of a transport vehicle being unavailable      RN Documentation    Flowsheet Row Most [address] [facility] Name, [address]    (Name & Tel number)  [facility]      Follow-up Information    None       There are no discharge medications for this patient  No discharge procedures on file      ED Provider  Electronically Signed by           Mitchel Mittal MD  03/11/18 9728

## 2019-03-08 ENCOUNTER — TRANSCRIBE ORDERS (OUTPATIENT)
Dept: ADMINISTRATIVE | Facility: HOSPITAL | Age: 59
End: 2019-03-08

## 2019-03-08 DIAGNOSIS — R60.9 FLUID RETENTION: Primary | ICD-10-CM

## 2019-03-11 ENCOUNTER — HOSPITAL ENCOUNTER (OUTPATIENT)
Dept: ULTRASOUND IMAGING | Facility: CLINIC | Age: 59
Discharge: HOME | End: 2019-03-11
Payer: COMMERCIAL

## 2019-03-11 DIAGNOSIS — R60.9 FLUID RETENTION: ICD-10-CM

## 2019-03-11 PROCEDURE — 76705 ECHO EXAM OF ABDOMEN: CPT

## 2023-01-12 NOTE — PROGRESS NOTES
PT Evaluation     Today's date: 2023  Patient name: Viridiana Forrester  : 1960  MRN: [id number]  Referring provider: Miles Ramirez MD  Dx:   Encounter Diagnosis     ICD-10-CM    1  Acute pain of left knee  M25 562                      Assessment  Assessment details: Pt presents with chronic left knee pain  No mechanism of onset noted  Symptoms worsening over time  Reports difficulty with prolonged activity, deep knee bends/end range flexion, squatting/lunge activity, biking  Reports use of brace did help with symptoms  Reports symptoms with activity but would worsen afterwards  Reports intermittent minimal swelling lower aspect anterior/medial knee  Reports no imaging of knee  No instability noted  PT notes minimal strength deficit, decreased LE mm flexibility  Minimal crepitus noted with squatting  Pt goal is to return to normal sport/exercise activity  Pt will benefit from PT tx to decrease symptoms and restore normal functional level  Impairments: abnormal gait, abnormal or restricted ROM, activity intolerance, impaired physical strength, lacks appropriate home exercise program and pain with function    Goals  ST  Decrease pain 50% 6 wk  2  Increase knee strength to 5/5 6 wk  3   Pt will report no difficulty with light ADL's 6 wk  4  Pt will report no difficulty with light sport activity 6 wk    LT  Pt will report no pain 12 wk  2  Increase LE mm flexibility to WNL 12 wk  3  Increase knee/LE strength to WNL 12 wk  4  Pt will report no limitations with ambulation 12 wk  5    Pt will report no limitations with sport activity 12 wk    Plan  Patient would benefit from: PT eval and skilled physical therapy  Planned modality interventions: cryotherapy and thermotherapy: hydrocollator packs  Planned therapy interventions: manual therapy, joint mobilization, abdominal trunk stabilization, neuromuscular re-education, strengthening, stretching, therapeutic activities, therapeutic exercise, flexibility, functional ROM exercises and home exercise program  Frequency: 3x week  Duration in weeks: 12  Treatment plan discussed with: patient        Subjective Evaluation    History of Present Illness  Mechanism of injury: Pt reports left knee pain that began spring 2022  Tried rest, heat/ice, but knee continued to worsen  Tried knee brace  Difficulty with deep knee bend, lunges  Has to modify activity due to symptoms  Pain anterior knee medial to patella  Intermittent right knee pain noted  Last surgery  but reports long time to recover with mm loss  No instability noted  Ache type pain  Juris Pen pose increases symptoms within seconds  No crepitus noted  No imaging performed  Brace does help control symptoms when used  No injections to knee  Minimal swelling noted anterior/medial knee  Pain  Current pain ratin  At best pain ratin  At worst pain rating: 3  Location: Left knee  Quality: dull ache  Aggravating factors: walking, standing and stair climbing    Exercise history: Enjoys regular exercise  Skiing, biking      Diagnostic Tests  No diagnostic tests performed  Patient Goals  Patient goals for therapy: decreased pain  Patient goal: Be able to perform normal activity, not have to modify         Objective     Tenderness   Left Knee   Tenderness in the medial joint line, medial patella, patellar tendon and pes anserinus  Active Range of Motion   Left Knee   Normal active range of motion    Right Knee   Normal active range of motion    Mobility   Patellar Mobility:   Left Knee   WFL: medial, lateral, superior and inferior       Strength/Myotome Testing     Left Knee   Flexion: 5  Extension: 4+    Right Knee   Flexion: 5  Extension: 5    Tests     Left Knee   Negative Apley's compression, Apley's distraction, lateral Tyree, medial Tyree, valgus stress test at 0 degrees, valgus stress test at 30 degrees, varus stress test at 0 degrees and varus stress test at 30 degrees       Swelling     Left Knee Girth Measurement (cm)   Joint line: 41 cm    Ambulation     Ambulation: Stairs   Ascend stairs: independent  Pattern: reciprocal  Railings: one rail  Descend stairs: independent  Pattern: reciprocal  Railings: one rail    Observational Gait   Gait: within functional limits     Additional Observational Gait Details  Reports pain medial knee with prolonged ambulation             Precautions:  N/A       Manuals 1-13-23       Stretch LLE, patella mobs                                Neuro Re-Ed         Side stepping t-band        U-stance low reach        Bridge w/ t-band abd        [employer]                                Ther Ex        Bike Upright 10'  L1       Leg Press        Leg ext        Leg curl        SLR 4 way        SAQ w/ Er                                HEP        Ther Activity                        Gait Training                        Modalities        CP/MHP

## 2023-01-13 ENCOUNTER — EVALUATION (OUTPATIENT)
Dept: PHYSICAL THERAPY | Facility: CLINIC | Age: 63
End: 2023-01-13

## 2023-01-13 DIAGNOSIS — M25.562 ACUTE PAIN OF LEFT KNEE: Primary | ICD-10-CM

## 2023-01-13 RX ORDER — PANTOPRAZOLE SODIUM 20 MG/1
20 TABLET, DELAYED RELEASE ORAL DAILY
COMMUNITY

## 2023-01-13 RX ORDER — CETIRIZINE HYDROCHLORIDE 10 MG/1
10 TABLET, CHEWABLE ORAL DAILY
COMMUNITY

## 2023-01-13 RX ORDER — ATORVASTATIN CALCIUM 10 MG/1
10 TABLET, FILM COATED ORAL DAILY
COMMUNITY

## 2023-01-13 RX ORDER — LEVOTHYROXINE SODIUM 0.1 MG/1
100 TABLET ORAL DAILY
COMMUNITY

## 2023-01-13 NOTE — LETTER
2023    Myesha Fam MD  [address] [address]    Patient: Richard Andres   YOB: 1960   Date of Visit: 2023     Encounter Diagnosis     ICD-10-CM    1  Acute pain of left knee  M25 562           Dear Dr Sergio Denson: Thank you for your recent referral of Richard Andres  Please review the attached evaluation summary from Cynthia's recent visit  Please verify that you agree with the plan of care by signing the attached order  If you have any questions or concerns, please do not hesitate to call  I sincerely appreciate the opportunity to share in the care of one of your patients and hope to have another opportunity to work with you in the near future  Sincerely,    Mary Alice Knight, PT      Referring Provider:      I certify that I have read the below Plan of Care and certify the need for these services furnished under this plan of treatment while under my care  Myesha Fam MD  [address] [address]  Via Fax: [phone]          PT Evaluation     Today's date: 2023  Patient name: Richard Andres  : 1960  MRN: [id number]  Referring provider: Don Solis MD  Dx:   Encounter Diagnosis     ICD-10-CM    1  Acute pain of left knee  M25 562                      Assessment  Assessment details: Pt presents with chronic left knee pain  No mechanism of onset noted  Symptoms worsening over time  Reports difficulty with prolonged activity, deep knee bends/end range flexion, squatting/lunge activity, biking  Reports use of brace did help with symptoms  Reports symptoms with activity but would worsen afterwards  Reports intermittent minimal swelling lower aspect anterior/medial knee  Reports no imaging of knee  No instability noted  PT notes minimal strength deficit, decreased LE mm flexibility  Minimal crepitus noted with squatting    Pt goal is to return to normal sport/exercise activity  Pt will benefit from PT tx to decrease symptoms and restore normal functional level  Impairments: abnormal gait, abnormal or restricted ROM, activity intolerance, impaired physical strength, lacks appropriate home exercise program and pain with function    Goals  ST  Decrease pain 50% 6 wk  2  Increase knee strength to 5/5 6 wk  3   Pt will report no difficulty with light ADL's 6 wk  4  Pt will report no difficulty with light sport activity 6 wk    LT  Pt will report no pain 12 wk  2  Increase LE mm flexibility to WNL 12 wk  3  Increase knee/LE strength to WNL 12 wk  4  Pt will report no limitations with ambulation 12 wk  5  Pt will report no limitations with sport activity 12 wk    Plan  Patient would benefit from: PT eval and skilled physical therapy  Planned modality interventions: cryotherapy and thermotherapy: hydrocollator packs  Planned therapy interventions: manual therapy, joint mobilization, abdominal trunk stabilization, neuromuscular re-education, strengthening, stretching, therapeutic activities, therapeutic exercise, flexibility, functional ROM exercises and home exercise program  Frequency: 3x week  Duration in weeks: 12  Treatment plan discussed with: patient        Subjective Evaluation    History of Present Illness  Mechanism of injury: Pt reports left knee pain that began spring 2022  Tried rest, heat/ice, but knee continued to worsen  Tried knee brace  Difficulty with deep knee bend, lunges  Has to modify activity due to symptoms  Pain anterior knee medial to patella  Intermittent right knee pain noted  Last surgery  but reports long time to recover with mm loss  No instability noted  Ache type pain  Dala Corn[place] pose increases symptoms within seconds  No crepitus noted  No imaging performed  Brace does help control symptoms when used  No injections to knee  Minimal swelling noted anterior/medial knee      Pain  Current pain ratin  At best pain ratin  At worst pain rating: 3  Location: Left knee  Quality: dull ache  Aggravating factors: walking, standing and stair climbing    Exercise history: Enjoys regular exercise  Skiing, biking      Diagnostic Tests  No diagnostic tests performed  Patient Goals  Patient goals for therapy: decreased pain  Patient goal: Be able to perform normal activity, not have to modify         Objective     Tenderness   Left Knee   Tenderness in the medial joint line, medial patella, patellar tendon and pes anserinus  Active Range of Motion   Left Knee   Normal active range of motion    Right Knee   Normal active range of motion    Mobility   Patellar Mobility:   Left Knee   WFL: medial, lateral, superior and inferior  Strength/Myotome Testing     Left Knee   Flexion: 5  Extension: 4+    Right Knee   Flexion: 5  Extension: 5    Tests     Left Knee   Negative Apley's compression, Apley's distraction, lateral Tyree, medial Tyree, valgus stress test at 0 degrees, valgus stress test at 30 degrees, varus stress test at 0 degrees and varus stress test at 30 degrees       Swelling     Left Knee Girth Measurement (cm)   Joint line: 41 cm    Ambulation     Ambulation: Stairs   Ascend stairs: independent  Pattern: reciprocal  Railings: one rail  Descend stairs: independent  Pattern: reciprocal  Railings: one rail    Observational Gait   Gait: within functional limits     Additional Observational Gait Details  Reports pain medial knee with prolonged ambulation            Precautions:  N/A       Manuals -       Stretch LLE, patella mobs                                Neuro Re-Ed         Side stepping t-band        U-stance low reach        Bridge w/ t-band abd        [employer]                                Ther Ex        Bike Upright 10'  L1       Leg Press        Leg ext        Leg curl        SLR 4 way        SAQ w/ Er                                HEP        Ther Activity Gait Training                        Modalities        CP/MHP

## 2023-01-20 ENCOUNTER — OFFICE VISIT (OUTPATIENT)
Dept: PHYSICAL THERAPY | Facility: CLINIC | Age: 63
End: 2023-01-20

## 2023-01-20 DIAGNOSIS — M25.562 ACUTE PAIN OF LEFT KNEE: Primary | ICD-10-CM

## 2023-01-20 NOTE — PROGRESS NOTES
Daily Note     Today's date: 2023  Patient name: Porsche Pham  : 1960  MRN: [id number]  Referring provider: Ori Sosa MD  Dx:   Encounter Diagnosis     ICD-10-CM    1  Acute pain of left knee  M25 562                      Subjective: The knee is a little achy at times  Deep knee bends bother it        Objective: See treatment diary below      Assessment: Tolerated treatment well  New TE added  Reports mm soreness with leg ext in quad mm  Discussed incorporating new TE into normal exercise program 2-3 days a week  Will progress as possible depending on knee symptoms  Patient would benefit from continued PT      Plan: Continue per plan of care         Precautions:  N/A       Manuals 23      Stretch LLE, patella mobs  8'                              Neuro Re-Ed         Side stepping t-band  Green 3x R/L 20 feelt      U-stance low reach  2x 10 lisa  2 riser      Bridge w/ t-band abd  30x  Green band      [employer]                                Ther Ex        Bike Upright 10'  L1 L3  10'      Leg Press  30#  3x10      Leg ext  10#  25x      Leg curl  15#  3x10      SLR 4 way        SAQ w/ Er  2#  3x10                              HEP        Ther Activity                        Gait Training                        Modalities        CP/MHP

## 2023-01-30 ENCOUNTER — OFFICE VISIT (OUTPATIENT)
Dept: PHYSICAL THERAPY | Facility: CLINIC | Age: 63
End: 2023-01-30

## 2023-01-30 DIAGNOSIS — M25.562 ACUTE PAIN OF LEFT KNEE: Primary | ICD-10-CM

## 2023-02-13 ENCOUNTER — OFFICE VISIT (OUTPATIENT)
Dept: PHYSICAL THERAPY | Facility: CLINIC | Age: 63
End: 2023-02-13

## 2023-02-13 DIAGNOSIS — M25.562 ACUTE PAIN OF LEFT KNEE: Primary | ICD-10-CM

## 2023-02-13 NOTE — PROGRESS NOTES
Daily Note     Today's date: 2023  Patient name: Maria E Gutierrez  : 1960  MRN: [id number]  Referring provider: Ed Barton MD  Dx:   Encounter Diagnosis     ICD-10-CM    1  Acute pain of left knee  M25 562                      Subjective: Emily Shelton reports achiness to medial aspect of L knee following squat/plie' exercises  She reports limiting ROM to decrease discomfort  Objective: See treatment diary below      Assessment: Visual and VC to ensure correct exercise technique and to avoid L knee valgus  Patient had increases in "achiness" following SL stability exercises  Full knee extension during SL stance resulted in no pain/achiness and educated to complete at home instead of bent knee until it is tolerated  Added 1# cuff weight to supine SLR to further challenge patient strength with good tolerance  Progress as able  Plan: Continue with current POC to address pt deficits           Precautions:  N/A       Manuals 23    Stretch LLE, patella mobs  8' 8' 10'                            Neuro Re-Ed         Side stepping t-band  Green 3x R/L 20 feelt Green 3x R/L 20 feelt grn 2x R/L     U-stance low reach  2x 10 lisa  2 riser 2x10 lisa 2 riser x5-increase in achiness, trialed SL w/3 way reach 5x     Bridge w/ t-band abd  30x  Green band 30x  Green band 30x grn band     [employer]                                Ther Ex        Bike Upright 10'  L1 L3  10' L3 12' L3 12'    Leg Press  30#  3x10 30# 3x10 60# 2x10    Leg ext  10#  25x 10# 3x10 held    Leg curl  15#  3x10 15# 3x10 15# 2x10    SLR 4 way   20x ea lisa 1# 3x10 ea     SAQ w/ Er  2#  3x10 2# 3x10 2# 3x10                            HEP        Ther Activity                        Gait Training                        Modalities        CP/MHP

## 2023-02-27 ENCOUNTER — OFFICE VISIT (OUTPATIENT)
Dept: PHYSICAL THERAPY | Facility: CLINIC | Age: 63
End: 2023-02-27

## 2023-02-27 DIAGNOSIS — M25.562 ACUTE PAIN OF LEFT KNEE: Primary | ICD-10-CM

## 2023-02-27 NOTE — PROGRESS NOTES
Daily Note     Today's date: 2023  Patient name: Angle Corey  : 1960  MRN: [id number]  Referring provider: Anant Hebert MD  Dx:   Encounter Diagnosis     ICD-10-CM    1  Acute pain of left knee  M25 562                      Subjective: The knee had been feeling good until Wednesday when I did more driving  It was a dull ache after for a few days  Objective: See treatment diary below      Assessment: Tolerated treatment well  Added BOSU squat and discussed using softer surfaces with balance activities at home  Reports LLE feeling stronger  Continued ache at times medial aspect of knee  Reports performing all activities presently  Patient would benefit from continued PT      Plan: Continue per plan of care         Precautions:  N/A       Manuals 23   Stretch LLE, patella mobs  8' 8' 10' 10'                           Neuro Re-Ed         Side stepping t-band  Green 3x R/L 20 feelt Green 3x R/L 20 feelt grn 2x R/L  Blue  3x  R/L 20 feet   U-stance low reach  2x 10 lisa  2 riser 2x10 lisa 2 riser x5-increase in achiness, trialed SL w/3 way reach 5x  10x  BLE reach to 2 riser   Bridge w/ t-band abd  30x  Green band 30x  Green band 30x grn band  Blue 30x    M D C  Holdings squat     10x                     Ther Ex        Bike Upright 10'  L1 L3  10' L3 12' L3 12' L3  12'   Leg Press  30#  3x10 30# 3x10 60# 2x10 75#  30x   Leg ext  10#  25x 10# 3x10 held 40#  3x10   Leg curl  15#  3x10 15# 3x10 15# 2x10 45#  2x15   SLR 4 way   20x ea lisa 1# 3x10 ea  2#  2x15 ea   SAQ w/ Er  2#  3x10 2# 3x10 2# 3x10 2#  30x                           HEP     Soft surface balance activities   Ther Activity                        Gait Training                        Modalities        CP/MHP

## 2023-03-10 ENCOUNTER — OFFICE VISIT (OUTPATIENT)
Dept: PHYSICAL THERAPY | Facility: CLINIC | Age: 63
End: 2023-03-10

## 2023-03-10 DIAGNOSIS — M25.562 ACUTE PAIN OF LEFT KNEE: Primary | ICD-10-CM

## 2023-03-10 NOTE — PROGRESS NOTES
Daily Note     Today's date: 3/10/2023  Patient name: Kim Murray  : 1960  MRN: [id number]  Referring provider: Veronica Medellin MD  Dx:   Encounter Diagnosis     ICD-10-CM    1  Acute pain of left knee  M25 562                      Subjective: The knee still gets achey with activity  I've been noticing it more when I do the leg press at the gym      Objective: See treatment diary below      Assessment: Tolerated treatment well  Reports continued ache in knee during exercise and with prolonged activity  Increased symptoms with knee in WB flexed position as leg press now producing symptoms  New TE added that she will continue with at home  Discussed MD follow up due to continued symptoms  Patient would benefit from continued PT      Plan: Continue per plan of care         Precautions:  N/A       Manuals 3-10-23 1-20-23 1/30/23 2-13-23 2-27-23   Stretch LLE, patella mobs  8' 8' 10' 10'                           Neuro Re-Ed         Side stepping t-band Band at ankle green 3x 20 feet R/L Green 3x R/L 20 feelt Green 3x R/L 20 feelt grn 2x R/L  Blue  3x  R/L 20 feet   Monster walk Green band 4x 20 feet       U-stance low reach  2x 10 lisa  2 riser 2x10 lisa 2 riser x5-increase in achiness, trialed SL w/3 way reach 5x  10x  BLE reach to 2 riser   Bridge w/ t-band abd Blue 30x 30x  Green band 30x  Green band 30x grn band  Blue 30x    Quad set        Bosu squat 15x    10x                     Ther Ex        Bike L3  12' L3  10' L3 12' L3 12' L3  12'   Leg Press 75#  30x 30#  3x10 30# 3x10 60# 2x10 75#  30x   Leg ext 40#  30x 10#  25x 10# 3x10 held 40#  3x10   Leg curl 45#  30x 15#  3x10 15# 3x10 15# 2x10 45#  2x15   SLR 4 way 3# 2x15  20x ea lisa 1# 3x10 ea  2#  2x15 ea   SAQ w/ Er 3#  30x  3" 2#  3x10 2# 3x10 2# 3x10 2#  30x   Wall sit w/ add sq 2x to vick       Physio-ball wall squat 20x               HEP Continue with new activities at home    Soft surface balance activities   Ther Activity Gait Training                        Modalities        CP/MHP

## 2023-03-13 ENCOUNTER — APPOINTMENT (OUTPATIENT)
Dept: PHYSICAL THERAPY | Facility: CLINIC | Age: 63
End: 2023-03-13

## 2023-03-22 NOTE — PROGRESS NOTES
Daily Note     Today's date: 3/23/2023  Patient name: Coreen Russo  : 1960  MRN: [id number]  Referring provider: Marbin Pedro MD  Dx:   Encounter Diagnosis     ICD-10-CM    1  Acute pain of left knee  M25 562                      Subjective: Gabi Jean reports continued "achiness" in L knee but reports some improvement in pain lvls  Objective: See treatment diary below      Assessment: Visual and VC to ensure correct exercise technique and facilitate appropriate musculature  Decreased pain in L knee with therapist assist to medial glide of patella  Trialed McConnel tape; pt denied any allergies to adhesives and educated to remove tape within 3 days or if irritation occurs  Added reverse clamshells to continue progressing hip strengthening in all planes with appropriate muscular fatigue; progress as able  Plan: Continue with current POC to address pt deficits        Manuals 3-10-23 3/23/23 1/30/23 2-13-23 2-27-23   Stretch LLE, patella mobs   8' 10' 10'   McConnel Tape   TB + educated on benefits, when to take off, reviewed contras                      Neuro Re-Ed         Side stepping t-band Band at ankle green 3x 20 feet R/L Band at ankle grn 3x20 ft R/L  Green 3x R/L 20 feelt grn 2x R/L  Blue  3x  R/L 20 feet   Monster walk Green band 4x 20 feet grn band 4x 20 ft       U-stance low reach   2x10 lisa 2 riser x5-increase in achiness, trialed SL w/3 way reach 5x  10x  BLE reach to 2 riser   Bridge w/ t-band abd Blue 30x Blue 3x10 30x  Green band 30x grn band  Blue 30x    Quad set        Bosu squat 15x 15x w/cues    10x                     Ther Ex        Bike L3  12' L3 12' L3 12' L3 12' L3  12'   Leg Press 75#  30x 75# 30x 30# 3x10 60# 2x10 75#  30x   Leg ext 40#  30x 40# 30x 10# 3x10 held 40#  3x10   Leg curl 45#  30x 45# 30x 15# 3x10 15# 2x10 45#  2x15   SLR 4 way 3# 2x15 3# 2x15 ea dir  20x ea lisa 1# 3x10 ea  2#  2x15 ea   SAQ w/ Er 3#  30x  3" 3# 30x 5"  2# 3x10 2# 3x10 2#  30x   Wall sit w/ add sq 2x to vick 2x to vick       Physio-ball wall squat 20x 2x10      clamshells  Reg and reverse 30x ea grn lisa       HEP Continue with new activities at home    Soft surface balance activities   Ther Activity                        Gait Training                        Modalities        CP/MHP

## 2023-03-23 ENCOUNTER — OFFICE VISIT (OUTPATIENT)
Dept: PHYSICAL THERAPY | Facility: CLINIC | Age: 63
End: 2023-03-23

## 2023-03-23 DIAGNOSIS — M25.562 ACUTE PAIN OF LEFT KNEE: Primary | ICD-10-CM

## 2023-03-30 ENCOUNTER — OFFICE VISIT (OUTPATIENT)
Dept: PHYSICAL THERAPY | Facility: CLINIC | Age: 63
End: 2023-03-30

## 2023-03-30 DIAGNOSIS — M25.562 ACUTE PAIN OF LEFT KNEE: Primary | ICD-10-CM

## 2023-03-30 NOTE — PROGRESS NOTES
"Daily Note     Today's date: 3/30/2023  Patient name: Jose Newsome  : 1960  MRN: [id number]  Referring provider: Randa Whittington MD  Dx:   Encounter Diagnosis     ICD-10-CM    1  Acute pain of left knee  M25 562                      Subjective: The knee is about the same      Objective: See treatment diary below      Assessment: Tolerated treatment well  McConnel taping trialed again  No significant change noted after last session with taping  Discussed obtaining lateral J brace for use with activity to help with patellar alignment with activity  Patient would benefit from continued PT      Plan: Continue per plan of care        Manuals 3-10-23 3/23/23 3/30/23 2-13-23 2-27-23   Stretch LLE, patella mobs    10' 10'   [employer]nel Tape   TB + educated on benefits, when to take off, reviewed contras 8'                     Neuro Re-Ed         Side stepping t-band Band at ankle green 3x 20 feet R/L Band at ankle grn 3x20 ft R/L  Green 3x R/L 20 feet grn 2x R/L  Blue  3x  R/L 20 feet   Monster walk Green band 4x 20 feet grn band 4x 20 ft  4x 20 feet green band     U-stance low reach   Held pain x5-increase in achiness, trialed SL w/3 way reach 5x  10x  BLE reach to 2 riser   Bridge w/ t-band abd Blue 30x Blue 3x10 30x  Green band 30x grn band  Blue 30x    Quad set        Bosu squat 15x 15x w/cues  20x    10x                     Ther Ex        Bike L3  12' L3 12' L3 12' L3 12' L3  12'   Leg Press 75#  30x 75# 30x 75# 3x10 60# 2x10 75#  30x   Leg ext 40#  30x 40# 30x 40# 2x15 held 40#  3x10   Leg curl 45#  30x 45# 30x 45# 2x15 15# 2x10 45#  2x15   SLR 4 way 3# 2x15 3# 2x15 ea dir  3# 30x ea lisa 1# 3x10 ea  2#  2x15 ea   SAQ w/ Er 3#  30x  3\" 3# 30x 5\"  2# 3x10 2# 3x10 2#  30x   Wall sit w/ add sq 2x to vick 2x to vick  5x  To vick     Physio-ball wall squat 20x 2x10      clamshells  Reg and reverse 30x ea grn lisa  Green 30x lisa     HEP Continue with new activities at home    Soft surface balance activities   Ther " Activity                        Gait Training                        Modalities        CP/MHP

## 2023-04-06 ENCOUNTER — OFFICE VISIT (OUTPATIENT)
Dept: PHYSICAL THERAPY | Facility: CLINIC | Age: 63
End: 2023-04-06

## 2023-04-06 DIAGNOSIS — M25.562 ACUTE PAIN OF LEFT KNEE: Primary | ICD-10-CM

## 2023-04-06 NOTE — PROGRESS NOTES
"Daily Note     Today's date: 2023  Patient name: Arcadio Lehman  : 1960  MRN: [id number]  Referring provider: Don Khan MD  Dx:   Encounter Diagnosis     ICD-10-CM    1  Acute pain of left knee  M25 562                      Subjective: Pt reports that her knee has been doing well  Objective: See treatment diary below      Assessment: Pt had difficulty maintaining TKE during SLR and required verbal and tactile cues to correct  Despite correction unable to maintain  She additionally required verbal / tactile cues in order to be able to inhibit hip flexor activation with SLR abduction  Plan: Continue per plan of care  Progress treatment as tolerated         Manuals 3-10-23 3/23/23 3/30/23 4/6/23    Stretch LLE, patella mobs        [employer] Tape   TB + educated on benefits, when to take off, reviewed contras 8' Medial pull KB                    Neuro Re-Ed         Side stepping t-band Band at ankle green 3x 20 feet R/L Band at ankle grn 3x20 ft R/L  Green 3x R/L 20 feet Green 3x R/L 20 feet    Monster walk Green band 4x 20 feet grn band 4x 20 ft  4x 20 feet green band 4x 20 feet green band    U-stance low reach   Held pain     Bridge w/ t-band abd Blue 30x Blue 3x10 30x  Green band 30x  Green band    M D C  Holdings squat 15x 15x w/cues  20x   20x                      Ther Ex        Bike L3  12' L3 12' L3 12' L3 12'    Leg Press 75#  30x 75# 30x 75# 3x10 75# 3x10    Leg ext 40#  30x 40# 30x 40# 2x15 40# 2x15    Leg curl 45#  30x 45# 30x 45# 2x15 45# 2x15    SLR 4 way 3# 2x15 3# 2x15 ea dir  3# 30x ea lisa 3# 30x ea    SAQ w/ Er 3#  30x  3\" 3# 30x 5\"  2# 3x10 3# 30x 5\"     Wall sit w/ add sq 2x to vick 2x to vick  5x  To vick 5x  To vcik    Physio-ball wall squat 20x 2x10  2x10    clamshells  Reg and reverse 30x ea grn lisa  Green 30x lisa Green 30x lisa    HEP Continue with new activities at home       Ther Activity                        Gait Training                        Modalities      " CP/MHP

## 2023-04-12 ENCOUNTER — APPOINTMENT (OUTPATIENT)
Dept: PHYSICAL THERAPY | Facility: CLINIC | Age: 63
End: 2023-04-12

## 2023-04-27 ENCOUNTER — OFFICE VISIT (OUTPATIENT)
Dept: PHYSICAL THERAPY | Facility: CLINIC | Age: 63
End: 2023-04-27

## 2023-04-27 DIAGNOSIS — M25.562 ACUTE PAIN OF LEFT KNEE: Primary | ICD-10-CM

## 2023-04-27 NOTE — PROGRESS NOTES
"Daily Note     Today's date: 2023  Patient name: Mckenzie Jeong  : 1960  MRN: [id number]  Referring provider: Kira Craig MD  Dx:   Encounter Diagnosis     ICD-10-CM    1  Acute pain of left knee  M25 562                      Subjective: The knee has been doing good      Objective: See treatment diary below      Assessment: Tolerated treatment well  Reports left knee has been feeling good most of the time  Now has lateral patella support brace as well  Reports ache at times in knee  No instability noted  Performing all ADL's and exercising in gym without difficulties  At this time Pt to hold PT tx and continue with independent exercise program   Will return if any issues arise otherwise d/c services          Plan:  Continue POC as indicated      Manuals 4/20/23 4/27/23 3/30/23 4/6/23 4/13/23   Stretch LLE, patella mobs        [employer] Tape  8' medial pull TB +patellar PROM  8' Medial pull KB 8'                    Neuro Re-Ed         Side stepping t-band Blue 3x R/L 20 ft Band at ankle Blue 3x20 ft R/L  Green 3x R/L 20 feet Green 3x R/L 20 feet grn 3x R/L 20 ft   Monster walk Blue x3 20ft blue band  Blue band 3x 20 ft  4x 20 feet green band 4x 20 feet green band grn x3 20ft grn band    U-stance low reach   Held pain     Bridge w/ t-band abd 30x blue band  Blue 3x10 30x  Green band 30x  Green band 30x grn band    Quad set        Bosu squat 20x Held  20x   20x   x20   Fwd lunges (limited depth)  BOSU lunges 2x10 lisa  Held   x15 ea    Step ups  2 riser 2x10 lisa        Ther Ex        Bike L3 12' L3 15' L3 12' L3 12' L3 12'   Leg Press 75# 3x10 75# 30x 75# 3x10 75# 3x10 75# 3x10   Leg ext 40# 3x10 40# 30x 40# 2x15 40# 2x15 40# 3x10   Leg curl 45# 3x10 45# 30x 45# 2x15 45# 2x15 45# 3x10   SLR 4 way 3# 30x ea 3# 30 ea dir  3# 30x ea lisa 3# 30x ea 3# 30x ea   SAQ w/ Er 3# 30x 5\"  3# 30x 5\"  2# 3x10 3# 30x 5\"  3# 30x 5\"    Wall sit w/ add sq 5x to vick  5x to vick  5x  To vick 5x  To vick 5x to vick  " Physio-ball wall squat 2x10   2x10 2x10   clamshells Blue x30 lisa  Blue 30x Green 30x lisa Green 30x lisa grn x30 lisa    HEP        Ther Activity                        Gait Training                        Modalities        CP/MHP

## 2023-09-11 DIAGNOSIS — L23.9 ALLERGIC CONTACT DERMATITIS, UNSPECIFIED CAUSE: ICD-10-CM

## 2023-09-11 RX ORDER — CLOBETASOL PROPIONATE 0.46 MG/ML
SOLUTION TOPICAL
Qty: 50 ML | Refills: 2 | OUTPATIENT
Start: 2023-09-11

## 2024-10-14 ENCOUNTER — APPOINTMENT (OUTPATIENT)
Dept: RADIOLOGY | Facility: CLINIC | Age: 64
End: 2024-10-14
Payer: COMMERCIAL

## 2024-10-14 ENCOUNTER — OFFICE VISIT (OUTPATIENT)
Dept: URGENT CARE | Facility: CLINIC | Age: 64
End: 2024-10-14
Payer: COMMERCIAL

## 2024-10-14 VITALS
BODY MASS INDEX: 28.28 KG/M2 | OXYGEN SATURATION: 98 % | RESPIRATION RATE: 18 BRPM | DIASTOLIC BLOOD PRESSURE: 82 MMHG | TEMPERATURE: 98.2 F | HEART RATE: 102 BPM | SYSTOLIC BLOOD PRESSURE: 124 MMHG | WEIGHT: 186 LBS

## 2024-10-14 DIAGNOSIS — M25.561 ACUTE PAIN OF RIGHT KNEE: ICD-10-CM

## 2024-10-14 DIAGNOSIS — S83.91XA SPRAIN OF RIGHT KNEE, UNSPECIFIED LIGAMENT, INITIAL ENCOUNTER: Primary | ICD-10-CM

## 2024-10-14 PROCEDURE — 73564 X-RAY EXAM KNEE 4 OR MORE: CPT

## 2024-10-14 PROCEDURE — G0383 LEV 4 HOSP TYPE B ED VISIT: HCPCS | Performed by: PHYSICIAN ASSISTANT

## 2024-10-14 PROCEDURE — S9083 URGENT CARE CENTER GLOBAL: HCPCS | Performed by: PHYSICIAN ASSISTANT

## 2024-10-14 RX ORDER — EZETIMIBE 10 MG/1
10 TABLET ORAL DAILY
COMMUNITY

## 2024-10-14 NOTE — PATIENT INSTRUCTIONS
Xray provider read- no acute findings identified.      Discussed with patient how it is likely a knee sprain at this time.  Recommended knee brace to wear as needed for support.  Apply a compressive ACE bandage. Rest and elevate the affected painful area.  Apply cold compresses intermittently as needed.  As pain recedes, begin normal activities slowly as tolerated.     Referral for orthopedics will be sent in case it is needed for continued pain.    Follow up with PCP in 3-5 days.  Proceed to  ER if symptoms worsen.    If tests are performed, our office will contact you with results only if changes need to made to the care plan discussed with you at the visit. You can review your full results on [facility].

## 2024-10-14 NOTE — PROGRESS NOTES
[facility]        NAME: Cynthia Rangel is a 64 y.o. female  : 1960    MRN: [id number]  DATE: 2024  TIME: 10:02 AM    Assessment and Plan   Sprain of right knee, unspecified ligament, initial encounter [S83.91XA]  1. Sprain of right knee, unspecified ligament, initial encounter  Ambulatory Referral to Orthopedic Surgery      2. Acute pain of right knee  XR knee 4+ vw right injury            Patient Instructions     Patient Instructions   Xray provider read- no acute findings identified.      Discussed with patient how it is likely a knee sprain at this time.  Recommended knee brace to wear as needed for support.  Apply a compressive ACE bandage. Rest and elevate the affected painful area.  Apply cold compresses intermittently as needed.  As pain recedes, begin normal activities slowly as tolerated.     Referral for orthopedics will be sent in case it is needed for continued pain.    Follow up with PCP in 3-5 days.  Proceed to  ER if symptoms worsen.    If tests are performed, our office will contact you with results only if changes need to made to the care plan discussed with you at the visit. You can review your full results on [facility].      Chief Complaint     Chief Complaint   Patient presents with    Knee Pain     Pt c/o right knee pain that was sensitive that noticed last week and then last night knee gave out when going up stairs and hurts all the time ans knee looks swollen. Pt has slipped a few weeks ago and hit left side but didnt seem to bother so much on right side         History of Present Illness       Patient presents today for evaluation of right knee pain.  She states that it was slightly painful/sensitive last week, but then last night her knee gave out when she was going up the stairs.  She states that since then it has been hurting continuously and looks very swollen.  She did slip and fall a few weeks ago but did not notice any knee pain around that  time.        Review of Systems   Review of Systems   Musculoskeletal:  Positive for arthralgias, gait problem, joint swelling and myalgias.   All other systems reviewed and are negative.        Current Medications       Current Outpatient Medications:     atorvastatin (LIPITOR) 10 mg tablet, Take 10 mg by mouth daily, Disp: , Rfl:     cetirizine (ZyrTEC) 10 MG chewable tablet, Chew 10 mg daily, Disp: , Rfl:     ezetimibe (ZETIA) 10 mg tablet, Take 10 mg by mouth daily, Disp: , Rfl:     levothyroxine 100 mcg tablet, Take 100 mcg by mouth daily, Disp: , Rfl:     omalizumab (Xolair) subcutaneous injection, Inject 300 mg under the skin, Disp: , Rfl:     pantoprazole (PROTONIX) 20 mg tablet, Take 20 mg by mouth daily, Disp: , Rfl:     Current Allergies     Allergies as of 10/14/2024 - Reviewed 10/14/2024   Allergen Reaction Noted    Sunflower oil - food allergy Hives 01/13/2023    Metronidazole Rash 02/19/2018            The following portions of the patient's history were reviewed and updated as appropriate: allergies, current medications, past family history, past medical history, past social history, past surgical history and problem list.     Past Medical History:   Diagnosis Date    Disease of thyroid gland     Hyperlipidemia        Past Surgical History:   Procedure Laterality Date    ABDOMINAL SURGERY      4 surgeries    APPENDECTOMY      HERNIA REPAIR      OVARIAN CYST REMOVAL         History reviewed. No pertinent family history.      Medications have been verified.        Objective   /82   Pulse 102   Temp 98.2 °F (36.8 °C) (Tympanic)   Resp 18   Wt 84.4 kg (186 lb)   SpO2 98%   BMI 28.28 kg/m²        Physical Exam     Physical Exam  Vitals and nursing note reviewed.   Constitutional:       Appearance: Normal appearance.   Musculoskeletal:      Right knee: Swelling present. No bony tenderness. Normal range of motion. Tenderness present over the medial joint line and lateral joint line. No LCL laxity  or MCL laxity. Normal pulse.      Instability Tests: Anterior drawer test negative.   Skin:     General: Skin is warm and dry.   Neurological:      General: No focal deficit present.      Mental Status: She is alert and oriented to person, place, and time.   Psychiatric:         Mood and Affect: Mood normal.         Behavior: Behavior normal.     Orthopedic injury treatment    Date/Time: 10/14/2024 9:30 AM    Performed by: Carmen Ellington PA-C  Authorized by: Carmen Ellington PA-C    Patient Location:  [facility] Protocol:  Consent: Verbal consent obtained.  Consent given by: patient    Injury location:  Knee  Location details:  Right knee  Injury type:  Soft tissue  Neurovascular status: Neurovascularly intact    Distal perfusion: normal    Neurological function: normal    Range of motion: normal    Splint type: Soft, off the shelf knee brace.  Neurovascular status: Neurovascularly intact    Patient tolerance:  Patient tolerated the procedure well with no immediate complications

## 2024-10-28 ENCOUNTER — OFFICE VISIT (OUTPATIENT)
Dept: OBGYN CLINIC | Facility: CLINIC | Age: 64
End: 2024-10-28
Payer: COMMERCIAL

## 2024-10-28 VITALS
DIASTOLIC BLOOD PRESSURE: 97 MMHG | OXYGEN SATURATION: 99 % | RESPIRATION RATE: 18 BRPM | HEART RATE: 78 BPM | BODY MASS INDEX: 28.61 KG/M2 | HEIGHT: 68 IN | SYSTOLIC BLOOD PRESSURE: 144 MMHG | WEIGHT: 188.8 LBS

## 2024-10-28 DIAGNOSIS — M84.451D SUBCHONDRAL INSUFFICIENCY FRACTURE OF CONDYLE OF RIGHT FEMUR WITH ROUTINE HEALING, SUBSEQUENT ENCOUNTER: Primary | ICD-10-CM

## 2024-10-28 DIAGNOSIS — S83.91XA SPRAIN OF RIGHT KNEE, UNSPECIFIED LIGAMENT, INITIAL ENCOUNTER: ICD-10-CM

## 2024-10-28 PROCEDURE — 99203 OFFICE O/P NEW LOW 30 MIN: CPT | Performed by: STUDENT IN AN ORGANIZED HEALTH CARE EDUCATION/TRAINING PROGRAM

## 2024-10-28 RX ORDER — EPINEPHRINE 0.3 MG/.3ML
0.3 INJECTION SUBCUTANEOUS
COMMUNITY
Start: 2024-08-29

## 2024-10-28 RX ORDER — TACROLIMUS 1 MG/G
OINTMENT TOPICAL
COMMUNITY

## 2024-10-28 RX ORDER — PANTOPRAZOLE SODIUM 40 MG/1
TABLET, DELAYED RELEASE ORAL
COMMUNITY
Start: 2024-10-05

## 2024-10-28 RX ORDER — ATORVASTATIN CALCIUM 20 MG/1
20 TABLET, FILM COATED ORAL
COMMUNITY
Start: 2024-08-19

## 2024-10-28 RX ORDER — IVERMECTIN 10 MG/G
CREAM TOPICAL
COMMUNITY

## 2024-10-28 NOTE — PROGRESS NOTES
ASSESSMENT/PLAN:    Diagnoses and all orders for this visit:    Subchondral insufficiency fracture of condyle of right femur with routine healing, subsequent encounter  -     MRI knee right  wo contrast; Future    Sprain of right knee, unspecified ligament, initial encounter  -     Ambulatory Referral to Orthopedic Surgery    Other orders  -     EPINEPHrine (EPIPEN) 0.3 mg/0.3 mL SOAJ; Inject 0.3 mg into a muscle  -     tacrolimus (PROTOPIC) 0.1 % ointment; Apply topically daily at bedtime To affected area  -     atorvastatin (LIPITOR) 20 mg tablet; Take 20 mg by mouth daily at bedtime (Patient not taking: Reported on 10/28/2024)  -     pantoprazole (PROTONIX) 40 mg tablet; TAKE 1 TABLET BY MOUTH DAILY BEFORE A MEAL. 30 MINUTES BEFORE A MEAL (Patient not taking: Reported on 10/28/2024)  -     Ivermectin 1 % CREA; apply to affected area every day        Discussed history, exam, and imaging with patient. Presentation most consistent with subchondral insufficiency fracture of the right medial femoral condyle with possible MCL sprain +/- small meniscus tear and we will plan for non-operative management at this time.  Discussed oral/topical medication regimen. Will plan for continued use of OTC ibuprofen +/- tylenol for pain relief.  Discussed injections as a pain adjunct. Recommended that if she continues to have worsening pain we can consider injection.  Discussed rehabilitation efforts. Will plan for avoiding activities that are worsening the pain, but she can weight bear and perform activities as tolerated until we have results of the MRI.  Discussed advanced imaging in the form of MRI. Recommend MRI of the right knee to better assess for ligamentous or meniscal injury, and to confirm that no surgical intervention is warranted.  Follow-up with me following MRI  _____________________________________________________  CHIEF COMPLAINT:  Chief Complaint   Patient presents with    Right Knee - Pain     Patient fell 2 wks  prior to knee pain and thinks that maybe she was putting all of her weight on the right side increasing pain to 4. The more patient walks the worse pain gets.       SUBJECTIVE:  Cynthia Rangel is a 64 y.o. year old female who presents for evaluation of right knee pain. The issue began 2 weeks ago when she was walking up steps and felt right knee give out. At the time of the injury she avoided bearing weight. She went to urgent care where xrays of the knee were performed. She was told the xrays showed no fracture or dislocation, she was provided a brace, and told to follow up with orthopedics. She was using cane to ambulate for few days. She notes the pain has been improving but still present especially if she walks long distances or if she twists her knee. She typically walks her dog a mile per day and does yoga which she has had trouble with since the injury. Pain worst with weight bearing. She has been taking ibuprofen 3 times per day with some improvement in pain. She denies locking or catching of the knee. Denies numbness or tingling. Denies previous injections.        PAST MEDICAL HISTORY:  Past Medical History:   Diagnosis Date    Disease of thyroid gland     Hyperlipidemia        PAST SURGICAL HISTORY:  Past Surgical History:   Procedure Laterality Date    ABDOMINAL SURGERY      4 surgeries    APPENDECTOMY      HERNIA REPAIR      OVARIAN CYST REMOVAL         FAMILY HISTORY:  History reviewed. No pertinent family history.    SOCIAL HISTORY:  Social History     Tobacco Use    Smoking status: Never    Smokeless tobacco: Never   Substance Use Topics    Alcohol use: No    Drug use: No       MEDICATIONS:    Current Outpatient Medications:     atorvastatin (LIPITOR) 10 mg tablet, Take 10 mg by mouth daily, Disp: , Rfl:     cetirizine (ZyrTEC) 10 MG chewable tablet, Chew 10 mg daily, Disp: , Rfl:     EPINEPHrine (EPIPEN) 0.3 mg/0.3 mL SOAJ, Inject 0.3 mg into a muscle, Disp: , Rfl:     ezetimibe (ZETIA) 10 mg tablet,  "Take 10 mg by mouth daily, Disp: , Rfl:     Ivermectin 1 % CREA, apply to affected area every day, Disp: , Rfl:     levothyroxine 100 mcg tablet, Take 100 mcg by mouth daily, Disp: , Rfl:     omalizumab (Xolair) subcutaneous injection, Inject 300 mg under the skin, Disp: , Rfl:     tacrolimus (PROTOPIC) 0.1 % ointment, Apply topically daily at bedtime To affected area, Disp: , Rfl:     atorvastatin (LIPITOR) 20 mg tablet, Take 20 mg by mouth daily at bedtime (Patient not taking: Reported on 10/28/2024), Disp: , Rfl:     pantoprazole (PROTONIX) 20 mg tablet, Take 20 mg by mouth daily (Patient not taking: Reported on 10/28/2024), Disp: , Rfl:     pantoprazole (PROTONIX) 40 mg tablet, TAKE 1 TABLET BY MOUTH DAILY BEFORE A MEAL. 30 MINUTES BEFORE A MEAL (Patient not taking: Reported on 10/28/2024), Disp: , Rfl:     ALLERGIES:  Allergies   Allergen Reactions    Sunflower Oil - Food Allergy Hives    Metronidazole Rash       Review of systems:   Constitutional: Negative for fatigue, fever or loss of apetite.   HENT: Negative.    Respiratory: Negative for shortness of breath, dyspnea.    Cardiovascular: Negative for chest pain/tightness.   Gastrointestinal: Negative for abdominal pain, N/V.   Endocrine: Negative for cold/heat intolerance, unexplained weight loss/gain.   Genitourinary: Negative for flank pain, dysuria, hematuria.   Musculoskeletal: As in HPI   Skin: Negative for rash.    Neurological: Negative for numbness tingling  Psychiatric/Behavioral: Negative for agitation.  _____________________________________________________  PHYSICAL EXAMINATION:    Blood pressure 144/97, pulse 78, resp. rate 18, height 5' 8\" (1.727 m), weight 85.6 kg (188 lb 12.8 oz), SpO2 99%.    General: well developed and well nourished, alert, oriented times 3, and appears comfortable  HEENT: Benign, normocephalic, atraumatic  Cardiovascular: regular rate    Pulmonary: No wheezing or stridor  Abdomen: Soft, Nontender  Skin: No masses, " erythema, lacerations, fluctation, ulcerations  Neurovascular: as per MSK exam below    MUSCULOSKELETAL EXAMINATION:    Right knee    Mild antalgic gait  No bruising, swelling, or deformity  Trace effusion present  TTP medial joint line most focal at the medial femoral condyle   ROM flexion 0-130 with discomfort at terminal flexion  Stable to varus and valgus stress at 0 and 30 degrees of flexion  Normal lachman  Negative steinmann  Negative alison  Negative anterior/posterior drawer   Negative patellar grind    Left knee  ROM 0-140    _____________________________________________________  STUDIES REVIEWED:  Images personally reviewed by me today:    Xrays of the right knee taken on 10/14 demonstrate mild osteoarthritis most notable at the medial joint. Also demonstrated is enthesopathy of the superior patella      Scribe Attestation      I,:  Scotty Robles PA-C am acting as a scribe while in the presence of the attending physician.:       I,:  Jaun Garza MD personally performed the services described in this documentation    as scribed in my presence.:

## 2024-11-14 DIAGNOSIS — Z00.6 ENCOUNTER FOR EXAMINATION FOR NORMAL COMPARISON OR CONTROL IN CLINICAL RESEARCH PROGRAM: ICD-10-CM

## 2024-11-15 ENCOUNTER — HOSPITAL ENCOUNTER (OUTPATIENT)
Dept: MRI IMAGING | Facility: HOSPITAL | Age: 64
End: 2024-11-15
Payer: COMMERCIAL

## 2024-11-15 DIAGNOSIS — M84.451D SUBCHONDRAL INSUFFICIENCY FRACTURE OF CONDYLE OF RIGHT FEMUR WITH ROUTINE HEALING, SUBSEQUENT ENCOUNTER: ICD-10-CM

## 2024-11-15 PROCEDURE — 73721 MRI JNT OF LWR EXTRE W/O DYE: CPT

## 2024-11-18 ENCOUNTER — APPOINTMENT (OUTPATIENT)
Dept: LAB | Facility: HOSPITAL | Age: 64
End: 2024-11-18

## 2024-11-18 ENCOUNTER — OFFICE VISIT (OUTPATIENT)
Dept: OBGYN CLINIC | Facility: CLINIC | Age: 64
End: 2024-11-18
Payer: COMMERCIAL

## 2024-11-18 VITALS
HEIGHT: 68 IN | SYSTOLIC BLOOD PRESSURE: 109 MMHG | BODY MASS INDEX: 28.49 KG/M2 | RESPIRATION RATE: 18 BRPM | WEIGHT: 188 LBS | DIASTOLIC BLOOD PRESSURE: 78 MMHG | HEART RATE: 80 BPM

## 2024-11-18 DIAGNOSIS — Z00.6 ENCOUNTER FOR EXAMINATION FOR NORMAL COMPARISON OR CONTROL IN CLINICAL RESEARCH PROGRAM: ICD-10-CM

## 2024-11-18 DIAGNOSIS — S83.241A OTHER TEAR OF MEDIAL MENISCUS, CURRENT INJURY, RIGHT KNEE, INITIAL ENCOUNTER: Primary | ICD-10-CM

## 2024-11-18 PROCEDURE — 36415 COLL VENOUS BLD VENIPUNCTURE: CPT

## 2024-11-18 PROCEDURE — 20610 DRAIN/INJ JOINT/BURSA W/O US: CPT | Performed by: STUDENT IN AN ORGANIZED HEALTH CARE EDUCATION/TRAINING PROGRAM

## 2024-11-18 PROCEDURE — 99213 OFFICE O/P EST LOW 20 MIN: CPT | Performed by: STUDENT IN AN ORGANIZED HEALTH CARE EDUCATION/TRAINING PROGRAM

## 2024-11-18 RX ORDER — BUPIVACAINE HYDROCHLORIDE 2.5 MG/ML
4 INJECTION, SOLUTION INFILTRATION; PERINEURAL
Status: COMPLETED | OUTPATIENT
Start: 2024-11-18 | End: 2024-11-18

## 2024-11-18 RX ORDER — LIDOCAINE HYDROCHLORIDE 10 MG/ML
4 INJECTION, SOLUTION INFILTRATION; PERINEURAL
Status: COMPLETED | OUTPATIENT
Start: 2024-11-18 | End: 2024-11-18

## 2024-11-18 RX ADMIN — BUPIVACAINE HYDROCHLORIDE 4 ML: 2.5 INJECTION, SOLUTION INFILTRATION; PERINEURAL at 10:00

## 2024-11-18 RX ADMIN — LIDOCAINE HYDROCHLORIDE 4 ML: 10 INJECTION, SOLUTION INFILTRATION; PERINEURAL at 10:00

## 2024-11-26 LAB
APOB+LDLR+PCSK9 GENE MUT ANL BLD/T: NOT DETECTED
BRCA1+BRCA2 DEL+DUP + FULL MUT ANL BLD/T: NOT DETECTED
MLH1+MSH2+MSH6+PMS2 GN DEL+DUP+FUL M: NOT DETECTED

## 2025-02-18 ENCOUNTER — OFFICE VISIT (OUTPATIENT)
Dept: OBGYN CLINIC | Facility: CLINIC | Age: 65
End: 2025-02-18
Payer: COMMERCIAL

## 2025-02-18 VITALS — WEIGHT: 188 LBS | HEIGHT: 68 IN | BODY MASS INDEX: 28.49 KG/M2

## 2025-02-18 DIAGNOSIS — S83.241D OTHER TEAR OF MEDIAL MENISCUS, CURRENT INJURY, RIGHT KNEE, SUBSEQUENT ENCOUNTER: Primary | ICD-10-CM

## 2025-02-18 PROCEDURE — 99213 OFFICE O/P EST LOW 20 MIN: CPT | Performed by: STUDENT IN AN ORGANIZED HEALTH CARE EDUCATION/TRAINING PROGRAM

## 2025-02-18 PROCEDURE — 20610 DRAIN/INJ JOINT/BURSA W/O US: CPT

## 2025-02-18 RX ADMIN — BUPIVACAINE HYDROCHLORIDE 4 ML: 2.5 INJECTION, SOLUTION INFILTRATION; PERINEURAL at 13:15

## 2025-02-18 RX ADMIN — TRIAMCINOLONE ACETONIDE 80 MG: 40 INJECTION, SUSPENSION INTRA-ARTICULAR; INTRAMUSCULAR at 13:15

## 2025-02-18 RX ADMIN — LIDOCAINE HYDROCHLORIDE 4 ML: 10 INJECTION, SOLUTION INFILTRATION; PERINEURAL at 13:15

## 2025-02-18 NOTE — PROGRESS NOTES
ASSESSMENT/PLAN:    Diagnoses and all orders for this visit:    Other tear of medial meniscus, current injury, right knee, subsequent encounter  -     Large joint arthrocentesis: R knee    Other orders  -     Cancel: Large joint arthrocentesis      Presentation most consistent with right knee medial meniscal posterior horn root tear and we will plan for continued non-operative management at this time.  Patient elected to proceed with nonoperative management despite predictable progression of likely degeneration of cartilage and need for knee arthroplasty in the future.  Discussed rehabilitation efforts. Will plan for continued HEP.   Discussed oral/topical medication regimen. Will plan for continued OTC meds  Discussed injections as a pain adjunct.  Patient elects to receive repeat corticosteroid injection into the right knee at today's visit.  Follow-up with me 3 months for repeat evaluation and xrays.  _____________________________________________________  CHIEF COMPLAINT:  Chief Complaint   Patient presents with    Right Knee - Follow-up, Pain       SUBJECTIVE:  Cynthia Rangel is a 64 y.o. year old female who presents for follow up of right knee pain. She notes corticosteroid injection from 11/18/24 provided approximately 2 months of relief.  She notes that pain returned to the same level it was at before.  She denies additional symptoms or distal paresthesias.  She notes that she is interested in repeat injection at today's visit given the improvement she had last injection.    PAST MEDICAL HISTORY:  Past Medical History:   Diagnosis Date    Disease of thyroid gland     Hyperlipidemia        PAST SURGICAL HISTORY:  Past Surgical History:   Procedure Laterality Date    ABDOMINAL SURGERY      4 surgeries    APPENDECTOMY      HERNIA REPAIR      OVARIAN CYST REMOVAL         FAMILY HISTORY:  History reviewed. No pertinent family history.    SOCIAL HISTORY:  Social History     Tobacco Use    Smoking status: Never     "Smokeless tobacco: Never   Substance Use Topics    Alcohol use: No    Drug use: No       MEDICATIONS:    Current Outpatient Medications:     atorvastatin (LIPITOR) 10 mg tablet, Take 10 mg by mouth daily, Disp: , Rfl:     cetirizine (ZyrTEC) 10 MG chewable tablet, Chew 10 mg daily, Disp: , Rfl:     EPINEPHrine (EPIPEN) 0.3 mg/0.3 mL SOAJ, Inject 0.3 mg into a muscle, Disp: , Rfl:     ezetimibe (ZETIA) 10 mg tablet, Take 10 mg by mouth daily, Disp: , Rfl:     Ivermectin 1 % CREA, apply to affected area every day, Disp: , Rfl:     levothyroxine 100 mcg tablet, Take 100 mcg by mouth daily, Disp: , Rfl:     omalizumab (Xolair) subcutaneous injection, Inject 300 mg under the skin, Disp: , Rfl:     pantoprazole (PROTONIX) 20 mg tablet, Take 20 mg by mouth daily, Disp: , Rfl:     pantoprazole (PROTONIX) 40 mg tablet, , Disp: , Rfl:     tacrolimus (PROTOPIC) 0.1 % ointment, Apply topically daily at bedtime To affected area, Disp: , Rfl:     atorvastatin (LIPITOR) 20 mg tablet, Take 20 mg by mouth daily at bedtime (Patient not taking: Reported on 10/28/2024), Disp: , Rfl:     ALLERGIES:  Allergies   Allergen Reactions    Sunflower Oil - Food Allergy Hives    Metronidazole Rash       Review of systems:   Constitutional: Negative for fatigue, fever or loss of apetite.   HENT: Negative.    Respiratory: Negative for shortness of breath, dyspnea.    Cardiovascular: Negative for chest pain/tightness.   Gastrointestinal: Negative for abdominal pain, N/V.   Endocrine: Negative for cold/heat intolerance, unexplained weight loss/gain.   Genitourinary: Negative for flank pain, dysuria, hematuria.   Musculoskeletal: As in HPI   Skin: Negative for rash.    Neurological: Negative for numbness tingling  Psychiatric/Behavioral: Negative for agitation.  _____________________________________________________  PHYSICAL EXAMINATION:    Height 5' 8\" (1.727 m), weight 85.3 kg (188 lb).    General: well developed and well nourished, alert, oriented " times 3, and appears comfortable  HEENT: Benign, normocephalic, atraumatic  Cardiovascular: regular rate    Pulmonary: No wheezing or stridor  Abdomen: Soft, Nontender  Skin: No masses, erythema, lacerations, fluctation, ulcerations  Neurovascular: as per MSK exam below    MUSCULOSKELETAL EXAMINATION:    Right knee  Mild antalgic gait  No bruising, swelling, or deformity  Trace effusion present  Mild TTP medial joint line   ROM flexion 0-130 with discomfort at terminal flexion  4/5 quadriceps, hamstring strength  Neurovascularly intact distally    Large joint arthrocentesis: R knee  Universal Protocol:  Consent: Verbal consent obtained.  Risks and benefits: risks, benefits and alternatives were discussed  Consent given by: patient  Timeout called at: 2/18/2025 1:25 PM.  Patient understanding: patient states understanding of the procedure being performed  Site marked: the operative site was marked  Patient identity confirmed: verbally with patient  Supporting Documentation  Indications: pain   Procedure Details  Location: knee - R knee  Needle size: 22 G  Ultrasound guidance: no  Approach: anterolateral  Medications administered: 4 mL bupivacaine 0.25 %; 4 mL lidocaine 1 %; 80 mg triamcinolone acetonide 40 mg/mL    Patient tolerance: patient tolerated the procedure well with no immediate complications  Dressing:  Sterile dressing applied        _____________________________________________________  STUDIES REVIEWED:  Images personally reviewed by me today:    No new imaging reviewed at today's visit.      Scribe Attestation      I,:  Scotty Robles PA-C am acting as a scribe while in the presence of the attending physician.:       I,:  Jaun Garza MD personally performed the services described in this documentation    as scribed in my presence.:

## 2025-02-22 RX ORDER — LIDOCAINE HYDROCHLORIDE 10 MG/ML
4 INJECTION, SOLUTION INFILTRATION; PERINEURAL
Status: COMPLETED | OUTPATIENT
Start: 2025-02-18 | End: 2025-02-18

## 2025-02-22 RX ORDER — TRIAMCINOLONE ACETONIDE 40 MG/ML
80 INJECTION, SUSPENSION INTRA-ARTICULAR; INTRAMUSCULAR
Status: COMPLETED | OUTPATIENT
Start: 2025-02-18 | End: 2025-02-18

## 2025-02-22 RX ORDER — BUPIVACAINE HYDROCHLORIDE 2.5 MG/ML
4 INJECTION, SOLUTION INFILTRATION; PERINEURAL
Status: COMPLETED | OUTPATIENT
Start: 2025-02-18 | End: 2025-02-18

## 2025-05-20 ENCOUNTER — OFFICE VISIT (OUTPATIENT)
Dept: OBGYN CLINIC | Facility: CLINIC | Age: 65
End: 2025-05-20
Payer: COMMERCIAL

## 2025-05-20 ENCOUNTER — APPOINTMENT (OUTPATIENT)
Dept: RADIOLOGY | Facility: CLINIC | Age: 65
End: 2025-05-20
Attending: STUDENT IN AN ORGANIZED HEALTH CARE EDUCATION/TRAINING PROGRAM
Payer: COMMERCIAL

## 2025-05-20 VITALS — HEIGHT: 68 IN | WEIGHT: 185 LBS | BODY MASS INDEX: 28.04 KG/M2

## 2025-05-20 DIAGNOSIS — S83.241D OTHER TEAR OF MEDIAL MENISCUS, CURRENT INJURY, RIGHT KNEE, SUBSEQUENT ENCOUNTER: Primary | ICD-10-CM

## 2025-05-20 DIAGNOSIS — S83.241D OTHER TEAR OF MEDIAL MENISCUS, CURRENT INJURY, RIGHT KNEE, SUBSEQUENT ENCOUNTER: ICD-10-CM

## 2025-05-20 PROCEDURE — 20610 DRAIN/INJ JOINT/BURSA W/O US: CPT | Performed by: STUDENT IN AN ORGANIZED HEALTH CARE EDUCATION/TRAINING PROGRAM

## 2025-05-20 PROCEDURE — 99213 OFFICE O/P EST LOW 20 MIN: CPT | Performed by: STUDENT IN AN ORGANIZED HEALTH CARE EDUCATION/TRAINING PROGRAM

## 2025-05-20 PROCEDURE — 73564 X-RAY EXAM KNEE 4 OR MORE: CPT

## 2025-05-20 RX ORDER — BRIMONIDINE 5 MG/G
1 GEL TOPICAL DAILY
COMMUNITY
Start: 2025-04-22

## 2025-05-20 RX ORDER — CLOBETASOL PROPIONATE 0.5 MG/ML
1 SOLUTION TOPICAL
COMMUNITY
Start: 2025-04-08

## 2025-05-20 RX ADMIN — TRIAMCINOLONE ACETONIDE 80 MG: 40 INJECTION, SUSPENSION INTRA-ARTICULAR; INTRAMUSCULAR at 11:45

## 2025-05-20 RX ADMIN — LIDOCAINE HYDROCHLORIDE 4 ML: 10 INJECTION, SOLUTION INFILTRATION; PERINEURAL at 11:45

## 2025-05-20 RX ADMIN — BUPIVACAINE HYDROCHLORIDE 4 ML: 2.5 INJECTION, SOLUTION INFILTRATION; PERINEURAL at 11:45

## 2025-05-20 NOTE — PROGRESS NOTES
"[facility] Follow[facility]up Note    Patient Name:  Cynthia Rangel  MRN:  [id number]  Date of last visit: 2/18/25     Assessment/Plan:     Assessment & Plan  Other tear of medial meniscus, current injury, right knee, subsequent encounter  Activity recommendations:  Physical Therapy: ***  Imaging recommendations:   Injection today ***  Brace recommendations: ***  Medication recommendations:       Return with Dr Bass for discussion of TKA.      Subjective   Cynthia Rangel returns for follow-up of right knee medial meniscal posterior horn root tear, approximately 3 month(s) since last visit. At that time, in brief the plan was for: non operative management with oral medication regimen, injections, and referral to joint arthroplasty team once patient is interested in considering surgery in the form of right total knee arthroplasty.      Currently patient presents noting:   She notes that she continues to have pain mainly to the medial knee. She has been able to continue going to her exercise classes but is having moderate pain. She notes ibuprofen provides moderate relief. She denies any other new symptoms at today's visit. She notes that injection provided in February provided a few months of relief.      Objective     Ht 5' 8\" (1.727 m)   Wt 83.9 kg (185 lb)   BMI 28.13 kg/m²     Right knee  Mild antalgic gait  No bruising, swelling, or deformity  Trace effusion present  Mild TTP medial joint line   ROM flexion 0-125 with discomfort at terminal flexion  4/5 quadriceps, hamstring strength  Neurovascularly intact distally    Large joint arthrocentesis: R knee    Performed by: Jaun Garza MD  Authorized by: Jaun Garza MD    Universal Protocol:  Consent: Verbal consent obtained  Risks and benefits: risks, benefits and alternatives were discussed  Consent given by: patient  Timeout called at: 5/20/2025 12:06 PM.  Patient understanding: patient states understanding of the procedure being performed  Site " marked: the operative site was marked  Radiology Images displayed and confirmed. If images not available, report reviewed: imaging studies available  Patient identity confirmed: verbally with patient  Supporting Documentation  Indications: pain     Is this a Visco injection? NoProcedure Details  Location: knee - R knee  Needle size: 22 G  Ultrasound guidance: no  Approach: anterolateral  Medications administered: 4 mL bupivacaine 0.25 %; 4 mL lidocaine 1 %; 80 mg triamcinolone acetonide 40 mg/mL    Patient tolerance: patient tolerated the procedure well with no immediate complications  Dressing:  Sterile dressing applied            Data Review     I have personally reviewed pertinent films in PACS:    Xrays of the right knee taken on 5/20/25 demonstrate progression to moderate to severe medial compartment narrowing. ***      Pertinent PMH/Meds/Allergies reviewed as listed below:  Past Medical History:   Diagnosis Date    Disease of thyroid gland     Hyperlipidemia     Skin disorder     Current Medications[1] Allergies[2]     Scribe Attestation      I,:  Scotty Robles PA-C am acting as a scribe while in the presence of the attending physician.:       I,:  Jaun Garza MD personally performed the services described in this documentation    as scribed in my presence.:                  [1]   Current Outpatient Medications:     atorvastatin (LIPITOR) 10 mg tablet, Take 10 mg by mouth in the morning., Disp: , Rfl:     Brimonidine Tartrate 0.33 % GEL, Apply 1 Application topically daily, Disp: , Rfl:     cetirizine (ZyrTEC) 10 MG chewable tablet, Chew 10 mg in the morning., Disp: , Rfl:     clobetasol (TEMOVATE) 0.05 % external solution, Apply 1 Application topically, Disp: , Rfl:     EPINEPHrine (EPIPEN) 0.3 mg/0.3 mL SOAJ, Inject 0.3 mg into a muscle, Disp: , Rfl:     ezetimibe (ZETIA) 10 mg tablet, Take 10 mg by mouth in the morning., Disp: , Rfl:     Ivermectin 1 % CREA, , Disp: , Rfl:     levothyroxine 100 mcg  tablet, Take 100 mcg by mouth in the morning., Disp: , Rfl:     omalizumab (Xolair) subcutaneous injection, Inject 300 mg under the skin Will increase to 450MG Q 3 weeks in June, Disp: , Rfl:     pantoprazole (PROTONIX) 20 mg tablet, Take 20 mg by mouth in the morning., Disp: , Rfl:     pantoprazole (PROTONIX) 40 mg tablet, , Disp: , Rfl:     tacrolimus (PROTOPIC) 0.1 % ointment, Apply topically daily at bedtime To affected area, Disp: , Rfl:     atorvastatin (LIPITOR) 20 mg tablet, Take 20 mg by mouth daily at bedtime (Patient not taking: Reported on 10/28/2024), Disp: , Rfl:   [2]   Allergies  Allergen Reactions    Sunflower Oil - Food Allergy Hives    Metronidazole Rash

## 2025-05-20 NOTE — PROGRESS NOTES
ASSESSMENT/PLAN:    Diagnoses and all orders for this visit:    Other tear of medial meniscus, current injury, right knee, subsequent encounter  -     XR knee 4+ vw right injury; Future  -     Ambulatory Referral to Orthopedic Surgery; Future  -     Cancel: Large joint arthrocentesis: R knee  -     Large joint arthrocentesis: R knee    Other orders  -     Brimonidine Tartrate 0.33 % GEL; Apply 1 Application topically daily  -     clobetasol (TEMOVATE) 0.05 % external solution; Apply 1 Application topically  -     Cancel: Large joint arthrocentesis      Presentation most consistent with right knee medial meniscal posterior horn root tear and we will plan for continued non-operative management at this time.  Patient elected to proceed with nonoperative management despite predictable progression of likely degeneration of cartilage and need for knee arthroplasty in the future.  Discussed referral to Dr. Bass for consideration of right total knee arthroplasty.  Referral placed at today's visit.  Discussed rehabilitation efforts. Will plan for continued HEP.   Discussed oral/topical medication regimen. Will plan for continued OTC meds  Discussed injections as a pain adjunct. We discussed that if she has injection, she will be unable  to proceed with TKA within the next 3 months. She doesn't feel that she will want surgery within that time period. Patient elects to receive repeat corticosteroid injection into the right knee at today's visit.  Patient tolerated procedure well, procedure documentation below.  Follow-up with Dr. Bass for discussion about total knee arthroplasty.  _____________________________________________________  CHIEF COMPLAINT:  Chief Complaint   Patient presents with    Right Knee - Follow-up     Updated XR today. Pain and discomfort daily, but worsened with bad weather. She is apprehensive regarding the stability of the knee.       SUBJECTIVE:  Cynthia Rangel is a 64 y.o. year old female who  presents for follow up of right knee pain. She notes corticosteroid injection from 2/18/2025 provided approximately 2 months of relief.  She notes that pain returned to the same level it was at before.  She has been able to continue with most activities of daily living without any significant difficulty.  She notes she has continued to go to her exercise classes, but she does experience some pain with certain movements.  She denies additional symptoms or distal paresthesias.  She notes that she is interested in repeat injection at today's visit given the improvement she had last injection.    PAST MEDICAL HISTORY:  Past Medical History:   Diagnosis Date    Disease of thyroid gland     Hyperlipidemia     Skin disorder        PAST SURGICAL HISTORY:  Past Surgical History:   Procedure Laterality Date    ABDOMINAL SURGERY      4 surgeries    APPENDECTOMY      HERNIA REPAIR      OVARIAN CYST REMOVAL      SHOULDER SURGERY  2013       FAMILY HISTORY:  Family History   Problem Relation Age of Onset    Cancer Mother         Breast cancer    Anesthesia problems Father     Cancer Father         Multiple myeloma       SOCIAL HISTORY:  Social History     Tobacco Use    Smoking status: Never    Smokeless tobacco: Never   Substance Use Topics    Alcohol use: Yes     Alcohol/week: 6.0 standard drinks of alcohol     Types: 6 Standard drinks or equivalent per week     Comment: Weekends    Drug use: No       MEDICATIONS:    Current Outpatient Medications:     atorvastatin (LIPITOR) 10 mg tablet, Take 10 mg by mouth in the morning., Disp: , Rfl:     Brimonidine Tartrate 0.33 % GEL, Apply 1 Application topically daily, Disp: , Rfl:     cetirizine (ZyrTEC) 10 MG chewable tablet, Chew 10 mg in the morning., Disp: , Rfl:     clobetasol (TEMOVATE) 0.05 % external solution, Apply 1 Application topically, Disp: , Rfl:     EPINEPHrine (EPIPEN) 0.3 mg/0.3 mL SOAJ, Inject 0.3 mg into a muscle, Disp: , Rfl:     ezetimibe (ZETIA) 10 mg tablet,  "Take 10 mg by mouth in the morning., Disp: , Rfl:     Ivermectin 1 % CREA, , Disp: , Rfl:     levothyroxine 100 mcg tablet, Take 100 mcg by mouth in the morning., Disp: , Rfl:     omalizumab (Xolair) subcutaneous injection, Inject 300 mg under the skin Will increase to 450MG Q 3 weeks in June, Disp: , Rfl:     pantoprazole (PROTONIX) 20 mg tablet, Take 20 mg by mouth in the morning., Disp: , Rfl:     pantoprazole (PROTONIX) 40 mg tablet, , Disp: , Rfl:     tacrolimus (PROTOPIC) 0.1 % ointment, Apply topically daily at bedtime To affected area, Disp: , Rfl:     atorvastatin (LIPITOR) 20 mg tablet, Take 20 mg by mouth daily at bedtime (Patient not taking: Reported on 10/28/2024), Disp: , Rfl:     ALLERGIES:  Allergies   Allergen Reactions    Sunflower Oil - Food Allergy Hives    Metronidazole Rash       Review of systems:   Constitutional: Negative for fatigue, fever or loss of apetite.   HENT: Negative.    Respiratory: Negative for shortness of breath, dyspnea.    Cardiovascular: Negative for chest pain/tightness.   Gastrointestinal: Negative for abdominal pain, N/V.   Endocrine: Negative for cold/heat intolerance, unexplained weight loss/gain.   Genitourinary: Negative for flank pain, dysuria, hematuria.   Musculoskeletal: As in HPI   Skin: Negative for rash.    Neurological: Negative for numbness tingling  Psychiatric/Behavioral: Negative for agitation.  _____________________________________________________  PHYSICAL EXAMINATION:    Height 5' 8\" (1.727 m), weight 83.9 kg (185 lb).    General: well developed and well nourished, alert, oriented times 3, and appears comfortable  HEENT: Benign, normocephalic, atraumatic  Cardiovascular: regular rate    Pulmonary: No wheezing or stridor  Abdomen: Soft, Nontender  Skin: No masses, erythema, lacerations, fluctation, ulcerations  Neurovascular: as per MSK exam below    MUSCULOSKELETAL EXAMINATION:    Right knee  Mild antalgic gait  No bruising, swelling, or " deformity  Trace effusion present  Mild TTP medial joint line   ROM flexion 0-125 with discomfort at terminal flexion  4/5 quadriceps, hamstring strength  Neurovascularly intact distally    Large joint arthrocentesis: R knee    Performed by: Jaun Garza MD  Authorized by: Jaun Garza MD    Universal Protocol:  Consent: Verbal consent obtained  Risks and benefits: risks, benefits and alternatives were discussed  Consent given by: patient  Timeout called at: 5/20/2025 12:13 PM.  Patient understanding: patient states understanding of the procedure being performed  Site marked: the operative site was marked  Patient identity confirmed: verbally with patient  Supporting Documentation  Indications: pain     Is this a Visco injection? NoProcedure Details  Location: knee - R knee  Needle size: 22 G  Ultrasound guidance: no  Approach: anterolateral  Medications administered: 4 mL bupivacaine 0.25 %; 4 mL lidocaine 1 %; 80 mg triamcinolone acetonide 40 mg/mL    Patient tolerance: patient tolerated the procedure well with no immediate complications  Dressing:  Sterile dressing applied        _____________________________________________________  STUDIES REVIEWED:  Images personally reviewed by me today:    Xrays of the right knee taken on 5/20/2025 demonstrate progression of medial compartment narrowing compared to previous imaging, as expected with known root tear.  No new acute fracture or dislocation is demonstrated.        Scribe Attestation      I,:  Scotty Robles PA-C am acting as a scribe while in the presence of the attending physician.:       I,:  Jaun Garza MD personally performed the services described in this documentation    as scribed in my presence.:

## 2025-05-22 RX ORDER — BUPIVACAINE HYDROCHLORIDE 2.5 MG/ML
4 INJECTION, SOLUTION INFILTRATION; PERINEURAL
Status: COMPLETED | OUTPATIENT
Start: 2025-05-20 | End: 2025-05-20

## 2025-05-22 RX ORDER — LIDOCAINE HYDROCHLORIDE 10 MG/ML
4 INJECTION, SOLUTION INFILTRATION; PERINEURAL
Status: COMPLETED | OUTPATIENT
Start: 2025-05-20 | End: 2025-05-20

## 2025-05-22 RX ORDER — TRIAMCINOLONE ACETONIDE 40 MG/ML
80 INJECTION, SUSPENSION INTRA-ARTICULAR; INTRAMUSCULAR
Status: COMPLETED | OUTPATIENT
Start: 2025-05-20 | End: 2025-05-20

## 2025-06-05 ENCOUNTER — TELEPHONE (OUTPATIENT)
Dept: OBGYN CLINIC | Facility: CLINIC | Age: 65
End: 2025-06-05

## 2025-07-03 ENCOUNTER — TELEPHONE (OUTPATIENT)
Dept: OBGYN CLINIC | Facility: CLINIC | Age: 65
End: 2025-07-03

## 2025-07-03 NOTE — TELEPHONE ENCOUNTER
LVM for patient in regards to her insurance coming back rejected for her apportionment on 7/7/25 at 12:30 pm. Left a call back number [phone] to update new insurance information.

## 2025-07-03 NOTE — TELEPHONE ENCOUNTER
Patient called back. Her insurance card is scanned into the documents - She has [employer] ID# [id number] - it's in documents and scanned as 6/18/25

## 2025-07-07 ENCOUNTER — OFFICE VISIT (OUTPATIENT)
Dept: OBGYN CLINIC | Facility: CLINIC | Age: 65
End: 2025-07-07
Payer: COMMERCIAL

## 2025-07-07 VITALS — RESPIRATION RATE: 18 BRPM | HEIGHT: 68 IN | WEIGHT: 185 LBS | BODY MASS INDEX: 28.04 KG/M2

## 2025-07-07 DIAGNOSIS — S83.241D OTHER TEAR OF MEDIAL MENISCUS, CURRENT INJURY, RIGHT KNEE, SUBSEQUENT ENCOUNTER: ICD-10-CM

## 2025-07-07 DIAGNOSIS — M17.11 PRIMARY OSTEOARTHRITIS OF RIGHT KNEE: Primary | ICD-10-CM

## 2025-07-07 PROCEDURE — 99214 OFFICE O/P EST MOD 30 MIN: CPT | Performed by: ORTHOPAEDIC SURGERY

## 2025-07-07 NOTE — PROGRESS NOTES
Name: Cynthia Rangel      : 1960      MRN: [id number]  Encounter Provider: Reggie Bass DO  Encounter Date: 2025   Encounter department: [facility] [place]  :  Assessment & Plan  Primary osteoarthritis of right knee  Hyaluronic acid injection and medial  brace ordered.  Orders:    Injection Procedure Prior Authorization; Future    Durable Medical Equipment    Other tear of medial meniscus, current injury, right knee, subsequent encounter    Orders:    Ambulatory Referral to Orthopedic Surgery    Right knee osteoarthritis with concomitant posterior medial meniscus root tear with progressive joint space narrowing   X-rays and MRI were reviewed in office today with patient  Nonoperative treatment options discussed including oral analgesics, activity modification, injection therapy, medial  bracing. Surgical intervention was discussed in the form of total knee arthroplasty.  Risks of both nonsurgical and surgical treatment were discussed in detail with the patient.  She is very active with yoga and we did discuss the possibility of difficulty kneeling and with certain poses post knee arthroplasty.  We discussed trial of medial  bracing and hyaluronic acid injections prior to considering surgical intervention.     Patient was provided script for medial un  brace in office today.  Authorization ordered for Durolane injection in office today   I will see the patient back for hyaluronic acid injection once approved.    Chief Complaint     Right knee pain    History of the Present Illness   History of Present Illness   HPI   Cynthia Rangel is a 65 y.o. female with Right knee pain, she is referred by Dr. Garza for surgical consultation. Patient has been treating for right knee osteoarthritis, subchondral insufficiency fracture to right femoral condyle and degenerative medial root meniscus tear with home exercises, advil and cortisone injections.  "First injection on 11/18/24 provided 2 months of relief. Her second injection on 5/20/2025 provided 2-3 weeks of relief. Patient reports today wanting to discuss a right total knee arthroplasty as she continues with medial joint line discomfort. She has increased pain when attempting to walk or perform yoga. When pain is at its worst she feels it is rated 8/10.     Review of Systems     Review of Systems   Constitutional:  Negative for chills and fever.   HENT:  Negative for ear pain and sore throat.    Eyes:  Negative for pain and visual disturbance.   Respiratory:  Negative for cough and shortness of breath.    Cardiovascular:  Negative for chest pain and palpitations.   Gastrointestinal:  Negative for abdominal pain and vomiting.   Genitourinary:  Negative for dysuria and hematuria.   Musculoskeletal:  Positive for arthralgias. Negative for back pain.   Skin:  Negative for color change and rash.   Neurological:  Negative for seizures and syncope.   All other systems reviewed and are negative.      Physical Exam   Objective   Resp 18   Ht 5' 8\" (1.727 m)   Wt 83.9 kg (185 lb)   BMI 28.13 kg/m²        Right Knee  Range of motion from 0 to 130.    There is trace effusion.    There is  tenderness over the medial joint line.  .    The patient is able to perform a straight leg raise.    Varus stress testing reveals stable at 0 and 30 degrees   Valgus stress testing reveals stable at 0 and 30 degrees  The patient is neurovascular intact distally.      Eyes:  Anicteric sclerae.  Neck:  Supple.  Lungs:  Normal respiratory effort.  Cardiovascular:  Capillary refill is less than 2 seconds.  Skin:  Intact without erythema.  Neurologic:  Sensation grossly intact to light touch.  Psychiatric:  Mood and affect are appropriate.    Data Review     I have personally reviewed pertinent films in PACS, and my interpretation follows:    X-rays taken 5/20/2025 of [facility] independently reviewed and demonstrate moderate to severe " tricompartmental osteoarthritis with severe medial joint space narrowing, progressed from previous images on 10/14 . Small osteophyte to medial femoral epicondyle and medial plateau.     MRI of right knee obtained 11/15/2024 demonstrates medial meniscus posterior root tear, tricompartmental osteoarthritis and subchondral marrow edema.     Office notes from Dr. Garza reviewed, most recently from 5/20/2025.    Lab Results   Component Value Date/Time    HGBA1C 5.6 07/10/2024 09:11 AM       Past Medical History[1]    Past Surgical History[2]    Allergies[3]    Medications Ordered Prior to Encounter[4]    Social History[5]    Family History[6]      Procedures Performed     Procedures  No procedures were performed today     Scribe Attestation      I,:  Mariel Albright am acting as a scribe while in the presence of the attending physician.:       I,:  Reggie Bass DO personally performed the services described in this documentation    as scribed in my presence.:                 [1]   Past Medical History:  Diagnosis Date    Disease of thyroid gland     Hyperlipidemia     Skin disorder    [2]   Past Surgical History:  Procedure Laterality Date    ABDOMINAL SURGERY      4 surgeries    APPENDECTOMY      HERNIA REPAIR      OVARIAN CYST REMOVAL      SHOULDER SURGERY  2013   [3]   Allergies  Allergen Reactions    Sunflower Oil - Food Allergy Hives    Metronidazole Rash   [4]   Current Outpatient Medications on File Prior to Visit   Medication Sig Dispense Refill    atorvastatin (LIPITOR) 10 mg tablet Take 10 mg by mouth in the morning.      Brimonidine Tartrate 0.33 % GEL Apply 1 Application topically daily      cetirizine (ZyrTEC) 10 MG chewable tablet Chew 10 mg in the morning.      clobetasol (TEMOVATE) 0.05 % external solution Apply 1 Application topically      EPINEPHrine (EPIPEN) 0.3 mg/0.3 mL SOAJ Inject 0.3 mg into a muscle      ezetimibe (ZETIA) 10 mg tablet Take 10 mg by mouth in the morning.      Ivermectin  1 % CREA       levothyroxine 100 mcg tablet Take 100 mcg by mouth in the morning.      omalizumab (Xolair) subcutaneous injection Inject 300 mg under the skin Will increase to 450MG Q 3 weeks in June      pantoprazole (PROTONIX) 20 mg tablet Take 20 mg by mouth in the morning.      tacrolimus (PROTOPIC) 0.1 % ointment Apply topically daily at bedtime To affected area      atorvastatin (LIPITOR) 20 mg tablet Take 20 mg by mouth daily at bedtime      pantoprazole (PROTONIX) 40 mg tablet        No current facility-administered medications on file prior to visit.   [5]   Social History  Tobacco Use    Smoking status: Never    Smokeless tobacco: Never   Substance Use Topics    Alcohol use: Yes     Alcohol/week: 6.0 standard drinks of alcohol     Types: 6 Standard drinks or equivalent per week     Comment: Weekends    Drug use: No   [6]   Family History  Problem Relation Name Age of Onset    Cancer Mother Nayeli         Breast cancer    Anesthesia problems Father Marino     Cancer Father Marino         Multiple myeloma

## 2025-07-07 NOTE — ASSESSMENT & PLAN NOTE
Hyaluronic acid injection and medial  brace ordered.  Orders:    Injection Procedure Prior Authorization; Future    Durable Medical Equipment

## 2025-07-28 ENCOUNTER — PROCEDURE VISIT (OUTPATIENT)
Dept: OBGYN CLINIC | Facility: CLINIC | Age: 65
End: 2025-07-28
Payer: COMMERCIAL

## 2025-07-28 VITALS — WEIGHT: 185 LBS | BODY MASS INDEX: 28.04 KG/M2 | HEIGHT: 68 IN

## 2025-07-28 DIAGNOSIS — M17.11 PRIMARY OSTEOARTHRITIS OF RIGHT KNEE: Primary | ICD-10-CM

## 2025-07-28 PROCEDURE — 20610 DRAIN/INJ JOINT/BURSA W/O US: CPT | Performed by: PHYSICIAN ASSISTANT

## 2025-07-28 RX ORDER — CELECOXIB 200 MG/1
200 CAPSULE ORAL DAILY
COMMUNITY
Start: 2025-07-15